# Patient Record
Sex: MALE | Race: WHITE
[De-identification: names, ages, dates, MRNs, and addresses within clinical notes are randomized per-mention and may not be internally consistent; named-entity substitution may affect disease eponyms.]

---

## 2018-04-03 ENCOUNTER — HOSPITAL ENCOUNTER (OUTPATIENT)
Dept: HOSPITAL 47 - RADMRIMAIN | Age: 78
End: 2018-04-03
Attending: OTOLARYNGOLOGY
Payer: MEDICARE

## 2018-04-03 DIAGNOSIS — G52.9: ICD-10-CM

## 2018-04-03 DIAGNOSIS — Z98.890: ICD-10-CM

## 2018-04-03 DIAGNOSIS — H74.8X1: ICD-10-CM

## 2018-04-03 DIAGNOSIS — G31.9: Primary | ICD-10-CM

## 2018-04-03 PROCEDURE — 70551 MRI BRAIN STEM W/O DYE: CPT

## 2018-04-03 NOTE — MR
EXAMINATION TYPE: MR brain and iac wo con

 

DATE OF EXAM: 4/3/2018

 

COMPARISON: CT 1/16/2014 and preoperative MRI 8/17/2012

 

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

 

TECHNIQUE:  Multiplanar, multisequence images of the brain and brainstem were acquired without IV con
trast. Diffusion weighted imaging was performed.  Additional coned-down sequences through the interna
l auditory canals and posterior cranial fossa before IV contrast administration.  

 

The technologist notes that contrast was not given as the patient could no longer tolerate the exam.

 

 

FINDINGS:  

Diffusion weighted images demonstrate no evidence of an acute ischemic lesion in the brain.

 

T2/FLAIR weighted sequences show no significant white matter signal abnormality.

 

Midline structures demonstrate normal morphology.  The craniocervical junction is normal. 

 

There is mild generalized supratentorial volume loss. The ventricles are of normal caliber.  

 

There is no evidence of an acute intracranial hemorrhage, infarct, mass, mass-effect or an extra-axia
l fluid collection. 

 

There is no cerebellopontine angle mass. 

 

The internal auditory canals are otherwise symmetric.  

 

Brainstem and skull base abnormalities are  not seen.

 

There is continued opacification throughout the right-sided mastoid air cells status post partial res
ection. No fluid seen within the middle ear cavity. Small amount of trapped fluid in the inferior lef
t mastoid air cells as well.

 

There is moderate mucosal thickening throughout the ethmoid air cells and mild to moderate within the
 maxillary sinuses and frontal sinuses. This seems to be a small air-fluid level in the left maxillar
y sinus.

 

 

IMPRESSION:

1. Mild cerebral atrophy; no acute intracranial abnormality seen. Note that IV contrast was not admin
istered as the patient could no longer tolerate the exam.

2. Continued opacification of the right mastoid air cells status post partial resection. Correlate fo
r mastoiditis. On MRI, no definite fluid seen within the middle ear cavity.

3. Small amount of trapped fluid in the inferior left mastoid air cells as well. Correlate for any ma
stoid pain on this side to exclude mastoiditis.

4. Mild to moderate chronic pan sinus disease sparing the sphenoid sinuses. This shows significant im
provement from 2012. However, a small air-fluid level in the left maxillary sinus could represent a s
uperimposed acute sinusitis.

## 2022-04-14 ENCOUNTER — HOSPITAL ENCOUNTER (INPATIENT)
Dept: HOSPITAL 47 - EC | Age: 82
LOS: 8 days | Discharge: OTHER INSTITUTION | DRG: 291 | End: 2022-04-22
Attending: INTERNAL MEDICINE | Admitting: INTERNAL MEDICINE
Payer: MEDICARE

## 2022-04-14 VITALS — BODY MASS INDEX: 37.9 KG/M2

## 2022-04-14 DIAGNOSIS — E87.5: ICD-10-CM

## 2022-04-14 DIAGNOSIS — I27.20: ICD-10-CM

## 2022-04-14 DIAGNOSIS — D50.9: ICD-10-CM

## 2022-04-14 DIAGNOSIS — E66.9: ICD-10-CM

## 2022-04-14 DIAGNOSIS — N28.1: ICD-10-CM

## 2022-04-14 DIAGNOSIS — I50.33: ICD-10-CM

## 2022-04-14 DIAGNOSIS — Z79.82: ICD-10-CM

## 2022-04-14 DIAGNOSIS — E78.5: ICD-10-CM

## 2022-04-14 DIAGNOSIS — Z20.822: ICD-10-CM

## 2022-04-14 DIAGNOSIS — N17.0: ICD-10-CM

## 2022-04-14 DIAGNOSIS — C92.10: ICD-10-CM

## 2022-04-14 DIAGNOSIS — Z79.899: ICD-10-CM

## 2022-04-14 DIAGNOSIS — C64.1: ICD-10-CM

## 2022-04-14 DIAGNOSIS — C91.10: ICD-10-CM

## 2022-04-14 DIAGNOSIS — I25.10: ICD-10-CM

## 2022-04-14 DIAGNOSIS — R06.03: ICD-10-CM

## 2022-04-14 DIAGNOSIS — J18.9: ICD-10-CM

## 2022-04-14 DIAGNOSIS — I13.0: Primary | ICD-10-CM

## 2022-04-14 DIAGNOSIS — R09.02: ICD-10-CM

## 2022-04-14 DIAGNOSIS — Z95.5: ICD-10-CM

## 2022-04-14 DIAGNOSIS — K59.00: ICD-10-CM

## 2022-04-14 DIAGNOSIS — N18.31: ICD-10-CM

## 2022-04-14 LAB
ALBUMIN SERPL-MCNC: 3.1 G/DL (ref 3.5–5)
ALP SERPL-CCNC: 72 U/L (ref 38–126)
ALT SERPL-CCNC: 24 U/L (ref 4–49)
ANION GAP SERPL CALC-SCNC: 5 MMOL/L
APTT BLD: 23.8 SEC (ref 22–30)
AST SERPL-CCNC: 34 U/L (ref 17–59)
BASOPHILS # BLD AUTO: 0.1 K/UL (ref 0–0.2)
BASOPHILS NFR BLD AUTO: 0 %
BUN SERPL-SCNC: 36 MG/DL (ref 9–20)
CALCIUM SPEC-MCNC: 9 MG/DL (ref 8.4–10.2)
CHLORIDE SERPL-SCNC: 103 MMOL/L (ref 98–107)
CO2 SERPL-SCNC: 31 MMOL/L (ref 22–30)
EOSINOPHIL # BLD AUTO: 0.5 K/UL (ref 0–0.7)
EOSINOPHIL NFR BLD AUTO: 2 %
ERYTHROCYTE [DISTWIDTH] IN BLOOD BY AUTOMATED COUNT: 3.64 M/UL (ref 4.3–5.9)
ERYTHROCYTE [DISTWIDTH] IN BLOOD: 14.2 % (ref 11.5–15.5)
GLUCOSE BLD-MCNC: 136 MG/DL (ref 75–99)
GLUCOSE SERPL-MCNC: 101 MG/DL (ref 74–99)
HCT VFR BLD AUTO: 35.2 % (ref 39–53)
HGB BLD-MCNC: 11.5 GM/DL (ref 13–17.5)
INR PPP: 0.9 (ref ?–1.2)
LYMPHOCYTES # SPEC AUTO: 1.4 K/UL (ref 1–4.8)
LYMPHOCYTES NFR SPEC AUTO: 6 %
MAGNESIUM SPEC-SCNC: 2 MG/DL (ref 1.6–2.3)
MCH RBC QN AUTO: 31.5 PG (ref 25–35)
MCHC RBC AUTO-ENTMCNC: 32.6 G/DL (ref 31–37)
MCV RBC AUTO: 96.8 FL (ref 80–100)
MONOCYTES # BLD AUTO: 1.4 K/UL (ref 0–1)
MONOCYTES NFR BLD AUTO: 6 %
NEUTROPHILS # BLD AUTO: 18.1 K/UL (ref 1.3–7.7)
NEUTROPHILS NFR BLD AUTO: 82 %
PLATELET # BLD AUTO: 265 K/UL (ref 150–450)
POTASSIUM SERPL-SCNC: 4.4 MMOL/L (ref 3.5–5.1)
PROT SERPL-MCNC: 6.2 G/DL (ref 6.3–8.2)
PT BLD: 10.2 SEC (ref 9–12)
SODIUM SERPL-SCNC: 139 MMOL/L (ref 137–145)
WBC # BLD AUTO: 22 K/UL (ref 3.8–10.6)

## 2022-04-14 PROCEDURE — 93970 EXTREMITY STUDY: CPT

## 2022-04-14 PROCEDURE — 85027 COMPLETE CBC AUTOMATED: CPT

## 2022-04-14 PROCEDURE — 96365 THER/PROPH/DIAG IV INF INIT: CPT

## 2022-04-14 PROCEDURE — 87636 SARSCOV2 & INF A&B AMP PRB: CPT

## 2022-04-14 PROCEDURE — 87040 BLOOD CULTURE FOR BACTERIA: CPT

## 2022-04-14 PROCEDURE — 78580 LUNG PERFUSION IMAGING: CPT

## 2022-04-14 PROCEDURE — 96375 TX/PRO/DX INJ NEW DRUG ADDON: CPT

## 2022-04-14 PROCEDURE — 85730 THROMBOPLASTIN TIME PARTIAL: CPT

## 2022-04-14 PROCEDURE — 94760 N-INVAS EAR/PLS OXIMETRY 1: CPT

## 2022-04-14 PROCEDURE — 85379 FIBRIN DEGRADATION QUANT: CPT

## 2022-04-14 PROCEDURE — 83880 ASSAY OF NATRIURETIC PEPTIDE: CPT

## 2022-04-14 PROCEDURE — 83735 ASSAY OF MAGNESIUM: CPT

## 2022-04-14 PROCEDURE — 84484 ASSAY OF TROPONIN QUANT: CPT

## 2022-04-14 PROCEDURE — 76770 US EXAM ABDO BACK WALL COMP: CPT

## 2022-04-14 PROCEDURE — 84443 ASSAY THYROID STIM HORMONE: CPT

## 2022-04-14 PROCEDURE — 80048 BASIC METABOLIC PNL TOTAL CA: CPT

## 2022-04-14 PROCEDURE — 93005 ELECTROCARDIOGRAM TRACING: CPT

## 2022-04-14 PROCEDURE — 80053 COMPREHEN METABOLIC PANEL: CPT

## 2022-04-14 PROCEDURE — 81001 URINALYSIS AUTO W/SCOPE: CPT

## 2022-04-14 PROCEDURE — 85025 COMPLETE CBC W/AUTO DIFF WBC: CPT

## 2022-04-14 PROCEDURE — 71045 X-RAY EXAM CHEST 1 VIEW: CPT

## 2022-04-14 PROCEDURE — 85610 PROTHROMBIN TIME: CPT

## 2022-04-14 PROCEDURE — 36415 COLL VENOUS BLD VENIPUNCTURE: CPT

## 2022-04-14 PROCEDURE — 87635 SARS-COV-2 COVID-19 AMP PRB: CPT

## 2022-04-14 PROCEDURE — 84145 PROCALCITONIN (PCT): CPT

## 2022-04-14 PROCEDURE — 74176 CT ABD & PELVIS W/O CONTRAST: CPT

## 2022-04-14 PROCEDURE — 76705 ECHO EXAM OF ABDOMEN: CPT

## 2022-04-14 PROCEDURE — 99285 EMERGENCY DEPT VISIT HI MDM: CPT

## 2022-04-14 PROCEDURE — 93306 TTE W/DOPPLER COMPLETE: CPT

## 2022-04-14 NOTE — ECHOF
Referral Reason:CHF



MEASUREMENTS

--------

HEIGHT: 170.2 cm

WEIGHT: 104.3 kg

BP: 172/73

RVIDd:   3.3 cm     (< 3.3)

IVSd:   1.1 cm     (0.6 - 1.1)

LVIDd:   4.6 cm     (3.9 - 5.3)

LVPWd:   1.2 cm     (0.6 - 1.1)

IVSs:   1.7 cm

LVIDs:   3.0 cm

LVPWs:   1.7 cm

LA Diam:   3.5 cm     (2.7 - 3.8)

Ao Diam:   3.3 cm     (2.0 - 3.7)

MV EXCURSION:   17.354 mm     (> 18.000)

MV EF SLOPE:   131 mm/s     (70 - 150)

EPSS:   0.4 cm

MV E Flex:   1.23 m/s

MV DecT:   240 ms

MV A Flex:   1.23 m/s

MV E/A Ratio:   1.00 

AV maxP.97 mmHg

AV meanP.07 mmHg

RAP:   5.00 mmHg

RVSP:   40.70 mmHg







FINDINGS

--------

Sinus rhythm.

This was a technically adequate study.

The left ventricular size is normal.   There is borderline concentric left ventricular hypertrophy.  
 Overall left ventricular systolic function is normal with, an EF between 60 - 65 %.

The right ventricle is mildly enlarged.

The left atrium is normal in size.

The right atrium is normal in size.

Interatrial and interventricular septum intact.

There is mild aortic valve sclerosis.   There is mild aortic stenosis present.   Peak/mean gradient a
cross the Aortic Valve is 22.97mmHg / 12.07mmHg.

The mitral valve leaflets are mildly thickened.   Mild mitral annular calcification present.   Mild m
itral regurgitation is present.

Mild tricuspid regurgitation present.   There is mild pulmonary hypertension.   The right ventricular
 systolic pressure, as measured by Doppler, is 40.70mmHg.

The pulmonic valve was not well visualized.

The aortic root size is normal.

Normal inferior vena cava with normal inspiratory collapse consistent with estimated right atrial pre
ssure of  5 mmHg.

There is no pericardial effusion.



CONCLUSIONS

--------

1. The left ventricular size is normal.

2. There is borderline concentric left ventricular hypertrophy.

3. Overall left ventricular systolic function is normal with, an EF between 60 - 65 %.

4. The right ventricle is mildly enlarged.

5. There is mild aortic valve sclerosis.

6. There is mild aortic stenosis present.

7. Peak/mean gradient across the Aortic Valve is 22.97mmHg / 12.07mmHg.

8. The mitral valve leaflets are mildly thickened.

9. Mild mitral annular calcification present.

10. Mild mitral regurgitation is present.

11. Mild tricuspid regurgitation present.

12. There is mild pulmonary hypertension.

13. The right ventricular systolic pressure, as measured by Doppler, is 40.70mmHg.

14. There is no pericardial effusion.





SONOGRAPHER: Ninfa Mojica RDCS

## 2022-04-14 NOTE — ED
General Adult HPI





- General


Chief complaint: Shortness of Breath


Stated complaint: RAJANI


Time Seen by Provider: 04/14/22 09:05


Source: patient


Mode of arrival: wheelchair


Limitations: no limitations





- History of Present Illness


Initial comments: 


Dictation was produced using dragon dictation software. please excuse any 

grammatical, word or spelling errors. 











Chief Complaint: 82-year-old male presents to the emergency department for exert

ional dyspnea, lower extremity swelling and abdominal swelling.





History of Present Illness: Patient is a 82-year-old male has past medical 

history of coronary artery disease.  He denies any history of heart failure.  

Over the last couple days she's been having exertional dyspnea and swelling to 

his extremities and abdomen.  Patient states his been progressively worsening 

over the last several days.  Denies any chest pain at this time.  No fever or 

cough.  Patient does have an established cardiologist.  He has not had any 

recent echocardiograms.








The ROS documented in this emergency department record has been reviewed and 

confirmed by me.  Those systems with pertinent positive or negative responses 

have been documented in the HPI.  All other systems are other negative and/or 

noncontributory.








PHYSICAL EXAM:


General Impression: Alert and oriented x3, not in acute distress


HEENT: Normocephalic atraumatic, extra-ocular movements intact, pupils equal and

reactive to light bilaterally, mucous membranes moist.


Cardiovascular: Heart regular rate and rhythm


Chest: Mildly dyspneic, diffuse lung crackles


Abdomen: abdomen soft, non-tender, non-distended, no organomegaly


Musculoskeletal: Pulses present and equal in all extremities, no peripheral 

edema


Motor:  no focal deficits noted


Neurological: CN II-XII grossly intact, no focal motor or sensory deficits noted


Skin: Intact with no visualized rashes


Psych: Normal affect and mood





ED course: 82-year-old male presents to the emergency department with symptoms 

consistent with congestive heart failure.  Patient does not have a history of 

heart failure.  There is concern for new onset cardiomyopathy.  Vital signs upon

arrival are within acceptable limits.  He does appear mildly dyspneic at the 

bedside.  EKG does not show any signs of ischemia or infarction.





Laboratory evaluation shows leukocytosis of 22.0.  Rest of CBC coag panel is 

unremarkable.  Metabolic panel is within acceptable limits.  Troponin is 0.028 

pre-nitric peptide of 2670.  No old labs for comparison.  For panel viral PCR is

negative for influenza, RSV and COVID-19.  Chest x-ray read by radiology 

interpreted as right lower lobe infiltrate with small pleural effusion.  Patient

reevaluated at the bedside.  He states he didn't has been having very mild and 

not that noticeable symptoms of cough.  Clinically speaking his history of 

present illness suggests new-onset heart failure however pneumonia as part of 

the differential.  Patient treated with antibiotics.  He is given 40 mg of 

Lasix.  He evaluated at bedside at 10:45 AM found to be in stable medical co

ndition.  Patient be admitted.  Case discussed with son physician group was went

accept patient's care.  Cardiology consulted.





EKG interpretation: Ventricular rate 85, sinus rhythm, PA interval.  QRS 82, QTC

369











- Related Data


                                Home Medications











 Medication  Instructions  Recorded  Confirmed


 


Imatinib Mesylate [Gleevec] 400 mg PO DAILY 09/01/14 04/14/22


 


Aspirin EC [Ecotrin Low Dose] 81 mg PO DAILY 04/14/22 04/14/22


 


Carvedilol [Coreg] 12.5 mg PO BID 04/14/22 04/14/22


 


Diclofenac Sodium Gel [Voltaren 2 gm TOPICAL QID 04/14/22 04/14/22





Gel]   


 


Ferrous Sulfate [Feosol] 325 mg PO DAILY 04/14/22 04/14/22


 


amLODIPine/ATORVASTATIN [Caduet 10 1 tab PO DAILY 04/14/22 04/14/22





mg-20 mg Tablet]   











                                    Allergies











Allergy/AdvReac Type Severity Reaction Status Date / Time


 


No Known Allergies Allergy   Verified 04/14/22 10:33














Review of Systems


ROS Statement: 


Those systems with pertinent positive or pertinent negative responses have been 

documented in the HPI.





ROS Other: All systems not noted in ROS Statement are negative.





Past Medical History


Past Medical History: Coronary Artery Disease (CAD), Cancer, Hyperlipidemia, 

Hypertension


Additional Past Medical History / Comment(s): pleurisy


History of Any Multi-Drug Resistant Organisms: None Reported


Past Surgical History: Heart Catheterization With Stent


Past Psychological History: No Psychological Hx Reported


Smoking Status: Never smoker


Past Alcohol Use History: None Reported


Past Drug Use History: None Reported





General Exam


Limitations: no limitations





Course


                                   Vital Signs











  04/14/22 04/14/22





  08:59 09:06


 


Temperature 97.9 F 


 


Pulse Rate 86 


 


Respiratory 20 20





Rate  


 


Blood Pressure 161/80 


 


O2 Sat by Pulse 96 





Oximetry  














Medical Decision Making





- Lab Data


Result diagrams: 


                                 04/14/22 09:11





                                 04/14/22 09:11


                                   Lab Results











  04/14/22 04/14/22 04/14/22 Range/Units





  09:11 09:11 09:11 


 


WBC  22.0 H    (3.8-10.6)  k/uL


 


RBC  3.64 L    (4.30-5.90)  m/uL


 


Hgb  11.5 L    (13.0-17.5)  gm/dL


 


Hct  35.2 L    (39.0-53.0)  %


 


MCV  96.8    (80.0-100.0)  fL


 


MCH  31.5    (25.0-35.0)  pg


 


MCHC  32.6    (31.0-37.0)  g/dL


 


RDW  14.2    (11.5-15.5)  %


 


Plt Count  265    (150-450)  k/uL


 


MPV  7.5    


 


Neutrophils %  82    %


 


Lymphocytes %  6    %


 


Monocytes %  6    %


 


Eosinophils %  2    %


 


Basophils %  0    %


 


Neutrophils #  18.1 H    (1.3-7.7)  k/uL


 


Lymphocytes #  1.4    (1.0-4.8)  k/uL


 


Monocytes #  1.4 H    (0-1.0)  k/uL


 


Eosinophils #  0.5    (0-0.7)  k/uL


 


Basophils #  0.1    (0-0.2)  k/uL


 


PT   10.2   (9.0-12.0)  sec


 


INR   0.9   (<1.2)  


 


APTT   23.8   (22.0-30.0)  sec


 


Sodium    139  (137-145)  mmol/L


 


Potassium    4.4  (3.5-5.1)  mmol/L


 


Chloride    103  ()  mmol/L


 


Carbon Dioxide    31 H  (22-30)  mmol/L


 


Anion Gap    5  mmol/L


 


BUN    36 H  (9-20)  mg/dL


 


Creatinine    1.33 H  (0.66-1.25)  mg/dL


 


Est GFR (CKD-EPI)AfAm    57  (>60 ml/min/1.73 sqM)  


 


Est GFR (CKD-EPI)NonAf    49  (>60 ml/min/1.73 sqM)  


 


Glucose    101 H  (74-99)  mg/dL


 


Calcium    9.0  (8.4-10.2)  mg/dL


 


Magnesium    2.0  (1.6-2.3)  mg/dL


 


Total Bilirubin    0.5  (0.2-1.3)  mg/dL


 


AST    34  (17-59)  U/L


 


ALT    24  (4-49)  U/L


 


Alkaline Phosphatase    72  ()  U/L


 


Troponin I     (0.000-0.034)  ng/mL


 


NT-Pro-B Natriuret Pep     pg/mL


 


Total Protein    6.2 L  (6.3-8.2)  g/dL


 


Albumin    3.1 L  (3.5-5.0)  g/dL


 


Influenza Type A (PCR)     (Not Detectd)  


 


Influenza Type B (PCR)     (Not Detectd)  


 


RSV (PCR)     (Not Detectd)  


 


SARS-CoV-2 (PCR)     (Not Detectd)  














  04/14/22 04/14/22 04/14/22 Range/Units





  09:11 09:11 09:11 


 


WBC     (3.8-10.6)  k/uL


 


RBC     (4.30-5.90)  m/uL


 


Hgb     (13.0-17.5)  gm/dL


 


Hct     (39.0-53.0)  %


 


MCV     (80.0-100.0)  fL


 


MCH     (25.0-35.0)  pg


 


MCHC     (31.0-37.0)  g/dL


 


RDW     (11.5-15.5)  %


 


Plt Count     (150-450)  k/uL


 


MPV     


 


Neutrophils %     %


 


Lymphocytes %     %


 


Monocytes %     %


 


Eosinophils %     %


 


Basophils %     %


 


Neutrophils #     (1.3-7.7)  k/uL


 


Lymphocytes #     (1.0-4.8)  k/uL


 


Monocytes #     (0-1.0)  k/uL


 


Eosinophils #     (0-0.7)  k/uL


 


Basophils #     (0-0.2)  k/uL


 


PT     (9.0-12.0)  sec


 


INR     (<1.2)  


 


APTT     (22.0-30.0)  sec


 


Sodium     (137-145)  mmol/L


 


Potassium     (3.5-5.1)  mmol/L


 


Chloride     ()  mmol/L


 


Carbon Dioxide     (22-30)  mmol/L


 


Anion Gap     mmol/L


 


BUN     (9-20)  mg/dL


 


Creatinine     (0.66-1.25)  mg/dL


 


Est GFR (CKD-EPI)AfAm     (>60 ml/min/1.73 sqM)  


 


Est GFR (CKD-EPI)NonAf     (>60 ml/min/1.73 sqM)  


 


Glucose     (74-99)  mg/dL


 


Calcium     (8.4-10.2)  mg/dL


 


Magnesium     (1.6-2.3)  mg/dL


 


Total Bilirubin     (0.2-1.3)  mg/dL


 


AST     (17-59)  U/L


 


ALT     (4-49)  U/L


 


Alkaline Phosphatase     ()  U/L


 


Troponin I  0.028    (0.000-0.034)  ng/mL


 


NT-Pro-B Natriuret Pep   2670   pg/mL


 


Total Protein     (6.3-8.2)  g/dL


 


Albumin     (3.5-5.0)  g/dL


 


Influenza Type A (PCR)    Not Detected  (Not Detectd)  


 


Influenza Type B (PCR)    Not Detected  (Not Detectd)  


 


RSV (PCR)    Not Detected  (Not Detectd)  


 


SARS-CoV-2 (PCR)    Not Detected  (Not Detectd)  














Disposition


Clinical Impression: 


 Heart failure, Pneumonia





Disposition: ADMITTED AS IP TO THIS HOSP


Condition: Fair


Referrals: 


Wilber Fuentes MD [Primary Care Provider] - 1-2 days


Decision Time: 10:44

## 2022-04-14 NOTE — XR
EXAMINATION TYPE: XR chest 1V portable

 

DATE OF EXAM: 4/14/2022

 

COMPARISON: NONE

 

HISTORY: dyspnea 

 

TECHNIQUE: Single frontal view of the chest is obtained.

 

FINDINGS:  Right lower lobe infiltrate with small effusion. There is a coarsened interstitium. Heart 
is enlarged. There is hyperinflation. Diffuse osteopenia. Atherosclerotic change of the aorta.

 

IMPRESSION:  Right lower lobe infiltrate and small pleural effusion.

## 2022-04-14 NOTE — P.CRDCN
History of Present Illness


History of present illness: 


This is a 82 year old male with a past medical history of coronary artery 

disease s/p PCI x 2 to the mid LAD in 2006, hypertension, dyslipidemia, iron 

deficiency anemia.  He follows in the office with Dr. Burgos. We have been asked

to see the patient in consultation for congestive heart failure. He presents 

with worsening shortness of breath, dyspnea with activity and worsening 

bilaterally lower extremity edema. He states about 2 weeks ago he was able to 

perform his daily activities, go for walks, however over the past week his 

symptoms have worsened. He has gained about 10lbs over about a week. He denies 

any orthopnea or PND. He sleeps with 1 pillow. He was recently started on ferr

ous sulfate, but no further medication changes. He denies any increased salt 

intake/diet changes. He is a non-smoker. He denies history of diabetes or 

stroke. 


He does endorse a cough and diaphoresis yesterday. Denies fever or chills. 





DIAGNOSTICS


-EKG reveals sinus rhythm, heart rate 85, poor R wave progression, no acute ST 

ST wave abnormalities to suggest ischemia.  Prior EKG in the office in 02/2022 

with similar findings.


-Most recent echocardiogram in the office 04/2021 revealed an EF of 5560 

percent, mild mitral regurgitation, mild to moderate tricuspid regurgitation, 

trileaflet aortic valve, mild aortic regurgitation, mild aortic stenosis


-Chest xray right lower lobe infiltrate, right-sided pleural effusion


-Laboratory reviewed, troponin negative, proBNP 2670, sodium 139, potassium 4.4,

BUN 36, serum creatinine 1.3, magnesium 2.0, W proceed 22, hemoglobin 11.5, 

platelets 265


-Current home medications include Caduet 1020 milligrams daily, ferrous 

sulfate, carvedilol 12.5 mg twice a day, aspirin 80 mg daily


-Lexiscan stress test 01/14/2020 in the office revealed probably normal 

myocardial effusion perfusion imaging with a fixed inferior wall defect and 

normal gated SPECT images consistent with soft tissue attenuation.  No evidence 

of stress-induced ischemia.





REVIEW OF SYSTEMS


At the time of my exam:


CONSTITUTIONAL: Denies fever or chills.


CARDIOVASCULAR: Denies chest pain, +shortness of breath, +LE edema Denies 

orthopnea, PND or palpitations.


RESPIRATORY: Reports cough. 


GASTROINTESTINAL: Denies abdominal pain, diarrhea, constipation, nausea or 

vomiting.


MUSCULOSKELETAL: Denies myalgias.


NEUROLOGIC: Denies numbness, tingling, headacbe or weakness.


ENDOCRINE: Denies fatigue, weight change,  polydipsia or polyurina.


GENITOURINARY: Denies burning, hematuria or urgency with micturation.


HEMATOLOGIC: Denies history of anemia or bleeding. 





PHYSICAL EXAMINATION


Blood pressure 161/80, heart rate 86, afebrile, saturations 96% on room air


CONSTITUTIONAL: No apparent distress. 


HEENT: Head is normocephalic. Pupils are equal, round. Sclerae anicteric. Mucous

membranes of the mouth are moist.  No JVD. 


CHEST EXAMINATION: Lungs are diminished in the bases, crackles on the right 

lower lobe to auscultation. No chest wall tenderness is noted on palpation or 

with deep breathing. 


HEART EXAMINATION: Regular rate and rhythm. S1, S2 heard. Systolic ejection 

murmur heard at base and apex 


ABDOMEN: Soft, nontender. Positive bowel sounds.


EXTREMITIES: 2+ peripheral pulses, 3+ bilateral lower extremity edema and no 

calf tenderness.


NEUROLOGIC EXAMINATION: Patient is awake, alert and oriented x3. 





ASSESSMENT


Symptoms of shortness of breath, lower extremity edema, likely due to pneumonia 

vs component of heart failure with preserved EF, amlodipine could also be contri

buting lower extremity edema 


Right lower lobe pneumonia


Leukocytosis 


Coronary artery disease s/p PCI x 2 to the mid LAD in 2006


Hypertension


Dyslipidemia


Recent diagnosis of Iron deficiency anemia 


Valvular heart disease 





PLAN


Echo revealed EF 60-65%, mild aortic stenosis, mild mitral regurgitation, mild 

tricuspid regurgitation, mild pulmonary hypertension with RVSP of 40 mmHg


Stop amlodipine 


Transition to PO Lasix 40mg daily 


Monitor I/Os, daily weights, renal function and electrolytes


Pneumonia management per primary


Continue aspirin, statin, carvedilol


Further recommendations based on clinical course





Nurse practitioner note has been reviewed by physician. Signing provider agrees 

with the documented findings, assessment, and plan of care. 














Past Medical History


Past Medical History: Coronary Artery Disease (CAD), Cancer, Hyperlipidemia, Hy

pertension


Additional Past Medical History / Comment(s): pleurisy


History of Any Multi-Drug Resistant Organisms: None Reported


Past Surgical History: Heart Catheterization With Stent


Past Psychological History: No Psychological Hx Reported


Smoking Status: Never smoker


Past Alcohol Use History: None Reported


Past Drug Use History: None Reported





Medications and Allergies


                                Home Medications











 Medication  Instructions  Recorded  Confirmed  Type


 


Imatinib Mesylate [Gleevec] 400 mg PO DAILY 09/01/14 04/14/22 History


 


Aspirin EC [Ecotrin Low Dose] 81 mg PO DAILY 04/14/22 04/14/22 History


 


Carvedilol [Coreg] 12.5 mg PO BID 04/14/22 04/14/22 History


 


Diclofenac Sodium Gel [Voltaren 2 gm TOPICAL QID 04/14/22 04/14/22 History





Gel]    


 


Ferrous Sulfate [Feosol] 325 mg PO DAILY 04/14/22 04/14/22 History


 


amLODIPine/ATORVASTATIN [Caduet 10 1 tab PO DAILY 04/14/22 04/14/22 History





mg-20 mg Tablet]    








                                    Allergies











Allergy/AdvReac Type Severity Reaction Status Date / Time


 


No Known Allergies Allergy   Verified 04/14/22 10:33














Physical Exam


Vitals: 


                                   Vital Signs











  Temp Pulse Resp BP Pulse Ox


 


 04/14/22 11:14     172/73 


 


 04/14/22 09:06    20  


 


 04/14/22 08:59  97.9 F  86  20  161/80  96








                                Intake and Output











 04/13/22 04/14/22 04/14/22





 22:59 06:59 14:59


 


Other:   


 


  Weight   104.326 kg














Results





                                 04/14/22 09:11





                                 04/14/22 09:11


                                 Cardiac Enzymes











  04/14/22 04/14/22 Range/Units





  09:11 09:11 


 


AST  34   (17-59)  U/L


 


Troponin I   0.028  (0.000-0.034)  ng/mL








                                   Coagulation











  04/14/22 Range/Units





  09:11 


 


PT  10.2  (9.0-12.0)  sec


 


APTT  23.8  (22.0-30.0)  sec








                                       CBC











  04/14/22 Range/Units





  09:11 


 


WBC  22.0 H  (3.8-10.6)  k/uL


 


RBC  3.64 L  (4.30-5.90)  m/uL


 


Hgb  11.5 L  (13.0-17.5)  gm/dL


 


Hct  35.2 L  (39.0-53.0)  %


 


Plt Count  265  (150-450)  k/uL








                          Comprehensive Metabolic Panel











  04/14/22 Range/Units





  09:11 


 


Sodium  139  (137-145)  mmol/L


 


Potassium  4.4  (3.5-5.1)  mmol/L


 


Chloride  103  ()  mmol/L


 


Carbon Dioxide  31 H  (22-30)  mmol/L


 


BUN  36 H  (9-20)  mg/dL


 


Creatinine  1.33 H  (0.66-1.25)  mg/dL


 


Glucose  101 H  (74-99)  mg/dL


 


Calcium  9.0  (8.4-10.2)  mg/dL


 


AST  34  (17-59)  U/L


 


ALT  24  (4-49)  U/L


 


Alkaline Phosphatase  72  ()  U/L


 


Total Protein  6.2 L  (6.3-8.2)  g/dL


 


Albumin  3.1 L  (3.5-5.0)  g/dL








                               Current Medications











Generic Name Dose Route Start Last Admin





  Trade Name Freq  PRN Reason Stop Dose Admin


 


Aspirin  81 mg  04/15/22 09:00 





  Aspirin 81 Mg  PO  





  DAILY [facility]  


 


Atorvastatin Calcium  40 mg  04/15/22 09:00 





  Atorvastatin 40 Mg Tab  PO  





  DAILY [facility]  


 


Carvedilol  12.5 mg  04/14/22 17:30 





  Carvedilol 12.5 Mg Tab  PO  





  AC-BID [facility]  


 


Enoxaparin Sodium  40 mg  04/15/22 09:00 





  Enoxaparin 40 Mg/0.4 Ml Syringe  SQ  





  DAILY [facility]  


 


Ferrous Sulfate  325 mg  04/15/22 09:00 





  Ferrous Sulfate 325 Mg Tab  PO  





  DAILY [facility]  


 


Furosemide  40 mg  04/14/22 21:00 





  Furosemide 10 Mg/Ml 4 Ml Vial  IV  





  Q12HR [facility]  


 


Ceftriaxone Sodium 1 gm/  50 mls @ 100 mls/hr  04/15/22 09:00 





  Sodium Chloride  IVPB  04/20/22 09:01 





  Q24HR [facility]  





  Protocol  


 


Azithromycin 500 mg/ Sodium  250 mls @ 250 mls/hr  04/15/22 09:00 





  Chloride  IVPB  04/17/22 09:59 





  DAILY [facility]  





  Protocol  








                                Intake and Output











 04/13/22 04/14/22 04/14/22





 22:59 06:59 14:59


 


Other:   


 


  Weight   104.326 kg








                                 Patient Weight











 04/15/22





 06:59


 


Weight 104.326 kg








                                        





                                 04/14/22 09:11 





                                 04/14/22 09:11

## 2022-04-14 NOTE — US
EXAMINATION TYPE: US venous doppler duplex LE BI

 

DATE OF EXAM: 4/14/2022 11:17 AM

 

COMPARISON: NONE

 

CLINICAL HISTORY: Edema and pain, to r/o DVT.

 

SIDE PERFORMED: Bilateral  

 

TECHNIQUE:  The lower extremity deep venous system is examined utilizing real time linear array sonog
ananya with graded compression, doppler sonography and color-flow sonography.

 

VESSELS IMAGED:

Common Femoral Vein

Deep Femoral Vein

Greater Saphenous Vein *

Femoral Vein

Popliteal Vein

Small Saphenous Vein *

Proximal Calf Veins

(* superficial vessels)

 

 

 

Right Leg:  Appears negative for DVT

 

Left Leg:  Appears negative for DVT

 

 

 

IMPRESSION:

1. No diagnostic evidence of DVT.

## 2022-04-15 LAB
ANION GAP SERPL CALC-SCNC: 6 MMOL/L
BASOPHILS # BLD AUTO: 0 K/UL (ref 0–0.2)
BASOPHILS NFR BLD AUTO: 0 %
BUN SERPL-SCNC: 34 MG/DL (ref 9–20)
CALCIUM SPEC-MCNC: 8.8 MG/DL (ref 8.4–10.2)
CHLORIDE SERPL-SCNC: 99 MMOL/L (ref 98–107)
CO2 SERPL-SCNC: 32 MMOL/L (ref 22–30)
EOSINOPHIL # BLD AUTO: 0.5 K/UL (ref 0–0.7)
EOSINOPHIL NFR BLD AUTO: 3 %
ERYTHROCYTE [DISTWIDTH] IN BLOOD BY AUTOMATED COUNT: 3.56 M/UL (ref 4.3–5.9)
ERYTHROCYTE [DISTWIDTH] IN BLOOD: 14.1 % (ref 11.5–15.5)
GLUCOSE SERPL-MCNC: 120 MG/DL (ref 74–99)
HCT VFR BLD AUTO: 35 % (ref 39–53)
HGB BLD-MCNC: 11.1 GM/DL (ref 13–17.5)
LYMPHOCYTES # SPEC AUTO: 1.4 K/UL (ref 1–4.8)
LYMPHOCYTES NFR SPEC AUTO: 7 %
MCH RBC QN AUTO: 31.3 PG (ref 25–35)
MCHC RBC AUTO-ENTMCNC: 31.8 G/DL (ref 31–37)
MCV RBC AUTO: 98.3 FL (ref 80–100)
MONOCYTES # BLD AUTO: 1.4 K/UL (ref 0–1)
MONOCYTES NFR BLD AUTO: 8 %
NEUTROPHILS # BLD AUTO: 14.7 K/UL (ref 1.3–7.7)
NEUTROPHILS NFR BLD AUTO: 80 %
PLATELET # BLD AUTO: 253 K/UL (ref 150–450)
POTASSIUM SERPL-SCNC: 4.6 MMOL/L (ref 3.5–5.1)
SODIUM SERPL-SCNC: 137 MMOL/L (ref 137–145)
WBC # BLD AUTO: 18.5 K/UL (ref 3.8–10.6)

## 2022-04-15 RX ADMIN — ASPIRIN SCH MG: 325 TABLET ORAL at 21:04

## 2022-04-15 RX ADMIN — ENOXAPARIN SODIUM SCH MG: 40 INJECTION SUBCUTANEOUS at 09:20

## 2022-04-15 RX ADMIN — FUROSEMIDE SCH MG: 40 TABLET ORAL at 09:20

## 2022-04-15 RX ADMIN — AZITHROMYCIN MONOHYDRATE SCH MLS/HR: 500 INJECTION, POWDER, LYOPHILIZED, FOR SOLUTION INTRAVENOUS at 09:21

## 2022-04-15 RX ADMIN — ATORVASTATIN CALCIUM SCH MG: 40 TABLET, FILM COATED ORAL at 09:20

## 2022-04-15 RX ADMIN — Medication SCH MG: at 09:20

## 2022-04-15 RX ADMIN — ASPIRIN 81 MG CHEWABLE TABLET SCH MG: 81 TABLET CHEWABLE at 09:20

## 2022-04-15 NOTE — P.PN
Subjective


This is a 82 year old male with a past medical history of coronary artery 

disease s/p PCI x 2 to the mid LAD in 2006, hypertension, dyslipidemia, iron 

deficiency anemia, Leukemia.  He follows in the office with Dr. Burgos. We have 

been asked to see the patient in consultation for congestive heart failure. He 

presents with worsening shortness of breath, dyspnea with activity and worsening

bilaterally lower extremity edema. He states about 2 weeks ago he was able to 

perform his daily activities, go for walks, however over the past week his sympt

oms have worsened. He has gained about 10lbs over about a week. He denies any 

orthopnea or PND. He sleeps with 1 pillow. He was recently started on ferrous 

sulfate, but no further medication changes. He denies any increased salt 

intake/diet changes. He is a non-smoker. He denies history of diabetes or 

stroke. 


He does endorse a cough and diaphoresis yesterday. Denies fever or chills. 





DIAGNOSTICS


-Echo revealed EF 60-65%, mild aortic stenosis, mild mitral regurgitation, mild 

tricuspid regurgitation, mild pulmonary hypertension with RVSP of 40 mmHg


-Chest xray right lower lobe infiltrate, right-sided pleural effusion


-Venous dopplers negative for DVT bilaterally 





4/15/2022


Patient seen and examined at bedside, no acute distress.  He states he had 

increased shortness of breath, difficulty sleeping overnight.  He denies any 

chest pain.  Denies any orthopnea or PND.  His [place] edema has improved. He 

continues to have shortness of breath with activity. 





He is currently maintained on aspirin 81 mg daily, atorvastatin 40 mg daily, 

carvedilol 12.5 mg twice a day, Lasix 40 mg daily, hydralazine 25 mg TID


Also on IV antibiotics 


Repeat xray with improving infiltrate bilateral lower lobe 





Labs: Sodium 137, potassium 4.6, BUN 34, serum creatinine 1.38





PHYSICAL EXAMINATION


Blood pressure 116/72, heart rate 78, afebrile, oxygen saturation is 92% on room

air


CONSTITUTIONAL: No apparent distress. 


HEENT: Head is normocephalic. Pupils are equal, round. Sclerae anicteric. Mucous

membranes of the mouth are moist.  No JVD. 


CHEST EXAMINATION: Lungs are diminished in the bases, crackles on the right 

lower lobe to auscultation. No chest wall tenderness is noted on palpation or 

with deep breathing. 


HEART EXAMINATION: Regular rate and rhythm. S1, S2 heard. Systolic ejection 

murmur heard at base and apex 


ABDOMEN: Soft, nontender. Positive bowel sounds.


EXTREMITIES: 2+ peripheral pulses, 2+ bilateral lower extremity edema and no 

calf tenderness.


NEUROLOGIC EXAMINATION: Patient is awake, alert and oriented x3. 





ASSESSMENT


Symptoms of shortness of breath, lower extremity edema, likely due to pneumonia,

amlodipine could also be contributing lower extremity edema 


Right lower lobe pneumonia


Leukocytosis 


History of Leukemia, follows with Dr. Génesis chen


Coronary artery disease s/p PCI x 2 to the mid LAD in 2006


Hypertension


Dyslipidemia


Recent diagnosis of Iron deficiency anemia 


Valvular heart disease 





PLAN


Increase hydralazine to 50mg TID 


Continue to hold amlodipine 


PO Lasix 40mg daily 


Monitor I/Os, daily weights, renal function and electrolytes 


Pneumonia management per primary


Continue aspirin, statin, carvedilol


Further recommendations based on clinical course





Nurse practitioner note has been reviewed by physician. Signing provider agrees 

with the documented findings, assessment, and plan of care. 











Objective





- Vital Signs


Vital signs: 


                                   Vital Signs











Temp  97.8 F   04/15/22 03:25


 


Pulse  78   04/15/22 03:25


 


Resp  20   04/15/22 03:25


 


BP  169/72   04/15/22 03:25


 


Pulse Ox  92 L  04/15/22 03:25








                                 Intake & Output











 04/14/22 04/15/22 04/15/22





 18:59 06:59 18:59


 


Output Total 600  


 


Balance -600  


 


Weight 104.326 kg  


 


Output:   


 


  Urine 600  


 


Other:   


 


  Voiding Method Urinal Urinal 














- Labs


CBC & Chem 7: 


                                 04/15/22 08:30





                                 04/15/22 08:30


Labs: 


                  Abnormal Lab Results - Last 24 Hours (Table)











  04/14/22 04/14/22 04/14/22 Range/Units





  09:11 09:11 20:19 


 


WBC  22.0 H    (3.8-10.6)  k/uL


 


RBC  3.64 L    (4.30-5.90)  m/uL


 


Hgb  11.5 L    (13.0-17.5)  gm/dL


 


Hct  35.2 L    (39.0-53.0)  %


 


Neutrophils #  18.1 H    (1.3-7.7)  k/uL


 


Monocytes #  1.4 H    (0-1.0)  k/uL


 


Carbon Dioxide   31 H   (22-30)  mmol/L


 


BUN   36 H   (9-20)  mg/dL


 


Creatinine   1.33 H   (0.66-1.25)  mg/dL


 


Glucose   101 H   (74-99)  mg/dL


 


POC Glucose (mg/dL)    136 H  (75-99)  mg/dL


 


Total Protein   6.2 L   (6.3-8.2)  g/dL


 


Albumin   3.1 L   (3.5-5.0)  g/dL

## 2022-04-15 NOTE — P.PN
Subjective


Progress Note Date: 04/15/22


82-year-old male admitted yesterday with CHF exacerbation and community acquired

pneumonia.  He reports slight improvement today but is still complaining of a 

dry cough, and significant dyspnea with exertion.  He denies any chest pain, no 

fevers or chills, no shortness of breath at rest.  He is on day 2 of Rocephin 

and azithromycin, Lasix was changed to by mouth today








Objective





- Vital Signs


Vital signs: 


                                   Vital Signs











Temp  98.2 F   04/15/22 08:00


 


Pulse  74   04/15/22 08:00


 


Resp  20   04/15/22 08:00


 


BP  163/71   04/15/22 08:00


 


Pulse Ox  91 L  04/15/22 08:00








                                 Intake & Output











 04/14/22 04/15/22 04/15/22





 18:59 06:59 18:59


 


Intake Total   240


 


Output Total 600  


 


Balance -600  240


 


Weight 104.326 kg  104.326 kg


 


Intake:   


 


  Oral   240


 


Output:   


 


  Urine 600  


 


Other:   


 


  Voiding Method Urinal Urinal Urinal














- Exam





HEENT: Head is normocephalic. Pupils are equal, round. Sclerae anicteric. Mucous

membranes of the mouth are moist.  No JVD. 


CHEST EXAMINATION: Lungs are diminished in the bases, crackles on the right 

lower lobe to auscultation. No chest wall tenderness is noted on palpation or 

with deep breathing. 


HEART EXAMINATION: Regular rate and rhythm. S1, S2 heard. Systolic ejection 

murmur heard at base and apex 


ABDOMEN: Soft, nontender. Positive bowel sounds.


EXTREMITIES: 2+ peripheral pulses, 2+ bilateral lower extremity edema and no 

calf tenderness.


NEUROLOGIC EXAMINATION: Patient is awake, alert and oriented x3.





- Labs


CBC & Chem 7: 


                                 04/15/22 08:30





                                 04/15/22 08:30


Labs: 


                  Abnormal Lab Results - Last 24 Hours (Table)











  04/14/22 04/15/22 04/15/22 Range/Units





  20:19 08:30 08:30 


 


WBC   18.5 H   (3.8-10.6)  k/uL


 


RBC   3.56 L   (4.30-5.90)  m/uL


 


Hgb   11.1 L   (13.0-17.5)  gm/dL


 


Hct   35.0 L   (39.0-53.0)  %


 


Neutrophils #   14.7 H   (1.3-7.7)  k/uL


 


Monocytes #   1.4 H   (0-1.0)  k/uL


 


Carbon Dioxide    32 H  (22-30)  mmol/L


 


BUN    34 H  (9-20)  mg/dL


 


Creatinine    1.38 H  (0.66-1.25)  mg/dL


 


Glucose    120 H  (74-99)  mg/dL


 


POC Glucose (mg/dL)  136 H    (75-99)  mg/dL








                      Microbiology - Last 24 Hours (Table)











 04/14/22 11:00 Blood Culture - Preliminary





 Blood    No Growth after 24 hours


 


 04/14/22 10:45 Blood Culture - Preliminary





 Blood    No Growth after 24 hours














Assessment and Plan


Plan: 





Acute decompensated heart failure


- With preserved ejection fraction


- Lasix 40 mg IV given in the ED


-Lasix changed to oral today per cardiology


- Monitor intake and output, BMP, daily weights


- Fluid restriction to less than 1500 mL


- Amlodipine discontinued as it may contribute to lower extremity edema


-Patient counseled extensively on importance of fluid and salt restriction





Community acquired pneumonia


-Improving


- IV ceftriaxone and IV azithromycin to cover empirically


- Sputum culture, Legionella antigen pending





Coronary artery disease


- Continue home medications aspirin and atorvastatin


- Continue home medication carvedilol


-Coronary artery disease s/p PCI x 2 to the mid LAD in 2006


- We will check lipid profile and A1c.





Hypertension


- Continue Coreg, hydralazine adequate








 Obesity


- Lifestyle modification and dietary changes as able





History of Leukemia


follows with Dr. Capps group





Anticipated discharge home in 24-48 hours


Time with Patient: Less than 30

## 2022-04-16 LAB
ANION GAP SERPL CALC-SCNC: 5 MMOL/L
BASOPHILS # BLD AUTO: 0.1 K/UL (ref 0–0.2)
BASOPHILS NFR BLD AUTO: 1 %
BUN SERPL-SCNC: 33 MG/DL (ref 9–20)
CALCIUM SPEC-MCNC: 8.7 MG/DL (ref 8.4–10.2)
CHLORIDE SERPL-SCNC: 99 MMOL/L (ref 98–107)
CO2 SERPL-SCNC: 31 MMOL/L (ref 22–30)
EOSINOPHIL # BLD AUTO: 0.3 K/UL (ref 0–0.7)
EOSINOPHIL NFR BLD AUTO: 1 %
ERYTHROCYTE [DISTWIDTH] IN BLOOD BY AUTOMATED COUNT: 3.38 M/UL (ref 4.3–5.9)
ERYTHROCYTE [DISTWIDTH] IN BLOOD: 14.1 % (ref 11.5–15.5)
GLUCOSE SERPL-MCNC: 121 MG/DL (ref 74–99)
HCT VFR BLD AUTO: 33 % (ref 39–53)
HGB BLD-MCNC: 11 GM/DL (ref 13–17.5)
LYMPHOCYTES # SPEC AUTO: 1.1 K/UL (ref 1–4.8)
LYMPHOCYTES NFR SPEC AUTO: 6 %
MCH RBC QN AUTO: 32.5 PG (ref 25–35)
MCHC RBC AUTO-ENTMCNC: 33.3 G/DL (ref 31–37)
MCV RBC AUTO: 97.6 FL (ref 80–100)
MONOCYTES # BLD AUTO: 1.1 K/UL (ref 0–1)
MONOCYTES NFR BLD AUTO: 6 %
NEUTROPHILS # BLD AUTO: 15.1 K/UL (ref 1.3–7.7)
NEUTROPHILS NFR BLD AUTO: 85 %
PLATELET # BLD AUTO: 276 K/UL (ref 150–450)
POTASSIUM SERPL-SCNC: 4.8 MMOL/L (ref 3.5–5.1)
SODIUM SERPL-SCNC: 135 MMOL/L (ref 137–145)
WBC # BLD AUTO: 17.8 K/UL (ref 3.8–10.6)

## 2022-04-16 RX ADMIN — ATORVASTATIN CALCIUM SCH MG: 40 TABLET, FILM COATED ORAL at 09:25

## 2022-04-16 RX ADMIN — ASPIRIN 81 MG CHEWABLE TABLET SCH MG: 81 TABLET CHEWABLE at 09:25

## 2022-04-16 RX ADMIN — ASPIRIN SCH MG: 325 TABLET ORAL at 09:25

## 2022-04-16 RX ADMIN — ENOXAPARIN SODIUM SCH MG: 40 INJECTION SUBCUTANEOUS at 09:25

## 2022-04-16 RX ADMIN — FUROSEMIDE SCH MG: 40 TABLET ORAL at 09:26

## 2022-04-16 RX ADMIN — Medication SCH MG: at 09:26

## 2022-04-16 RX ADMIN — AZITHROMYCIN MONOHYDRATE SCH MLS/HR: 500 INJECTION, POWDER, LYOPHILIZED, FOR SOLUTION INTRAVENOUS at 10:21

## 2022-04-16 NOTE — P.CNPUL
History of Present Illness


Consult date: 04/16/22


Requesting physician: Tammy Jane


Reason for consult: dyspnea, abnormal CXR/CT


Chief complaint: Shortness of breath, dyspnea on exertion


History of present illness: 





This is an 82-year-old male patient who has a history of chronic lymphocytic 

leukemia, hypertension, hyperlipidemia, coronary artery disease with previous 

stent placement.  Nonsmoker.  He had presented to the emergency room back on 

04/14/2022 with complaints of increasing shortness of breath over the past month

worsening the past 2 weeks with significant dyspnea on exertion and swelling of 

the lower extremities.  X-ray revealed a right lower lobe infiltrate with small 

pleural effusion.  EKG reveals sinus rhythm with no significant ST or T wave 

abnormalities.  Echocardiogram revealed preserved left ventricular systolic 

function with ejection fraction 66 5%.  Mild pulmonary hypertension.  No 

significant valvular abnormalities.  Dopplers of the lower extremity were 

negative for DVT.  The cultures revealing no growth to date.  White count 17.8. 

Hemoglobin 11.0.  Sodium 135.  Potassium 4.8.  Bicarb 31.  BUN 33.  Creatinine 

1.27.  Glucose 121.  ProBNP 2670.  Influenza screen negative.  RSV screen 

negative.  COVID-19 screen negative.  The patient was treated with diuretics.  

Follow-up chest x-ray revealed improved bilateral lower lobe infiltrate and 

small effusion.  He is seen today in consultation on the selective care unit.  

Awake and alert in no acute distress.  He is sitting up in a chair at the 

bedside.  Maintaining O2 saturations in the 90s on 2 L/m per nasal cannula.  

Previously documented 96% on room air.  He's afebrile.  Hemodynamically stable.











Review of Systems





REVIEW OF SYSTEMS:


CONSTITUTIONAL: Denies any recent significant weight loss or weight gain.


EYES: Denies change in vision.


EARS, NOSE, MOUTH, THROAT: Denies headaches, denies sore throat.


CARDIOVASCULAR: Denies chest pain, palpitations or syncopal episodes.


RESPIRATORY: Positive for shortness of breath, cough, congestion no hemoptysis.


GASTROINTESTINAL: Denies change in appetite, denies abdominal pain


GENITOURINARY: Denies hematuria, denies infections.


MUSKULOSKELETAL: Positive for lower extremity swelling.


INTEGUMENTARY: Denies rash, denies eczema.


NEUROLOGICAL: Denies recent memory loss, no recent seizure activity. 


PSYCHIATRIC: Denies anxiety, denies depression.


HEMATOLOGIC/LYMPHATIC: Denies anemia, denies enlarged lymph nodes.








Past Medical History


Past Medical History: Coronary Artery Disease (CAD), Cancer, Hyperlipidemia, 

Hypertension


Additional Past Medical History / Comment(s): pleurisy


History of Any Multi-Drug Resistant Organisms: None Reported


Past Surgical History: Heart Catheterization With Stent


Date of Last Stent Placement:: 2007


Past Psychological History: No Psychological Hx Reported


Smoking Status: Never smoker


Past Alcohol Use History: None Reported


Past Drug Use History: None Reported





Medications and Allergies


                                Home Medications











 Medication  Instructions  Recorded  Confirmed  Type


 


Imatinib Mesylate [Gleevec] 400 mg PO DAILY 09/01/14 04/14/22 History


 


Aspirin EC [Ecotrin Low Dose] 81 mg PO DAILY 04/14/22 04/14/22 History


 


Carvedilol [Coreg] 12.5 mg PO BID 04/14/22 04/14/22 History


 


Diclofenac Sodium Gel [Voltaren 2 gm TOPICAL QID 04/14/22 04/14/22 History





Gel]    


 


Ferrous Sulfate [Feosol] 325 mg PO DAILY 04/14/22 04/14/22 History


 


amLODIPine/ATORVASTATIN [Caduet 10 1 tab PO DAILY 04/14/22 04/14/22 History





mg-20 mg Tablet]    








                                    Allergies











Allergy/AdvReac Type Severity Reaction Status Date / Time


 


No Known Allergies Allergy   Verified 04/14/22 10:33














Physical Exam


Vitals: 


                                   Vital Signs











  Temp Pulse Resp BP Pulse Ox


 


 04/16/22 08:00  98.0 F  78  18  139/67  96


 


 04/16/22 04:00  98.1 F  62  18  142/64  92 L


 


 04/16/22 00:00  98.1 F  76  18  142/68  95


 


 04/15/22 20:00  98.0 F  78  20  161/70  93 L


 


 04/15/22 16:00   79   139/65  90 L


 


 04/15/22 14:00   81  20  


 


 04/15/22 12:00   81   129/58  92 L








                                Intake and Output











 04/15/22 04/16/22 04/16/22





 22:59 06:59 14:59


 


Intake Total 250 180 240


 


Balance 250 180 240


 


Intake:   


 


  IV 10  


 


    Invasive Line 1 10  


 


  Oral 240 180 240


 


Other:   


 


  Voiding Method Toilet Toilet Toilet





 Urinal Urinal Urinal


 


  # Voids 1  














GENERAL EXAM: Alert, 82-year-old gentleman, up in a chair at the bedside, on 2 L

nasal cannula, comfortable in no apparent distress.


HEAD: Normocephalic.


EYES: Normal reaction of pupils, equal size.


NOSE: Clear with pink turbinates.


THROAT: No erythema or exudates.


NECK: No masses, no JVD.


CHEST: No chest wall deformity.


LUNGS: Equal air entry with crackles in the bilateral bases.


CVS: S1 and S2 normal with no audible murmur, regular rhythm.


ABDOMEN: No hepatosplenomegaly, normal bowel sounds, no guarding or rigidity.


SPINE: No scoliosis or deformity


SKIN: No rashes


CENTRAL NERVOUS SYSTEM: No focal deficits, tone is normal in all 4 extremities.


EXTREMITIES: There is trace peripheral edema.  No clubbing, no cyanosis.  

Peripheral pulses are intact.





Results





- Laboratory Findings


CBC and BMP: 


                                 04/16/22 08:14





                                 04/16/22 08:14


PT/INR, D-dimer











PT  10.2 sec (9.0-12.0)   04/14/22  09:11    


 


INR  0.9  (<1.2)   04/14/22  09:11    








Abnormal lab findings: 


                                  Abnormal Labs











  04/14/22 04/14/22 04/14/22





  09:11 09:11 20:19


 


WBC  22.0 H  


 


RBC  3.64 L  


 


Hgb  11.5 L  


 


Hct  35.2 L  


 


Neutrophils #  18.1 H  


 


Monocytes #  1.4 H  


 


Sodium   


 


Carbon Dioxide   31 H 


 


BUN   36 H 


 


Creatinine   1.33 H 


 


Glucose   101 H 


 


POC Glucose (mg/dL)    136 H


 


Total Protein   6.2 L 


 


Albumin   3.1 L 














  04/15/22 04/15/22 04/16/22





  08:30 08:30 08:14


 


WBC  18.5 H   17.8 H


 


RBC  3.56 L   3.38 L


 


Hgb  11.1 L   11.0 L


 


Hct  35.0 L   33.0 L


 


Neutrophils #  14.7 H   15.1 H


 


Monocytes #  1.4 H   1.1 H


 


Sodium   


 


Carbon Dioxide   32 H 


 


BUN   34 H 


 


Creatinine   1.38 H 


 


Glucose   120 H 


 


POC Glucose (mg/dL)   


 


Total Protein   


 


Albumin   














  04/16/22





  08:14


 


WBC 


 


RBC 


 


Hgb 


 


Hct 


 


Neutrophils # 


 


Monocytes # 


 


Sodium  135 L


 


Carbon Dioxide  31 H


 


BUN  33 H


 


Creatinine  1.27 H


 


Glucose  121 H


 


POC Glucose (mg/dL) 


 


Total Protein 


 


Albumin 














- Diagnostic Findings


Chest x-ray: image reviewed





Assessment and Plan


Assessment: 





1 Acute exacerbation of diastolic congestive heart failure.  Chest x-ray shows 

improvement post diuretics.





2 Dyspnea on exertion secondary to above, cannot completely rule out community-

acquired pneumonia of the right lung base. Procalcitonin pending





3 Coronary artery disease with previous stent placement





4 Hypertension





5 Hyperlipidemia





6 Chronic lymphocytic leukemia





Plan:





The patient was seen and evaluated


Chest x-rays, labs and medications reviewed


Pneumonia less likely based on 24-hour resolution of x-ray findings


We'll continue antibiotics for now, pro-calcitonin pending


Continue diuretics


Titrate the FiO2 as tolerated


We will continue to follow and make further recommendations based on his 

clinical status.





I have personally seen and examined the patient, performed the documentation and

the assessment and plan as written.  Number of minutes spent on the visit: 20.

## 2022-04-16 NOTE — P.PN
Subjective


Progress Note Date: 04/16/22


Patient reports minimal improvement in his dyspnea especially with exertion.  

Denies any chest pain, cough is improving.  He has been afebrile, vital signs 

have been stable.  Lower extremity swelling is decreasing








Objective





- Vital Signs


Vital signs: 


                                   Vital Signs











Temp  97.8 F   04/16/22 12:00


 


Pulse  77   04/16/22 12:00


 


Resp  18   04/16/22 12:00


 


BP  138/64   04/16/22 12:00


 


Pulse Ox  98   04/16/22 12:00








                                 Intake & Output











 04/15/22 04/16/22 04/16/22





 18:59 06:59 18:59


 


Intake Total 480 190 660


 


Balance 480 190 660


 


Weight 104.326 kg  


 


Intake:   


 


  IV  10 


 


    Invasive Line 1  10 


 


  Oral 480 180 660


 


Other:   


 


  Voiding Method Urinal Toilet Toilet





  Urinal Urinal


 


  # Voids  1 














- Constitutional


General appearance: Present: cooperative, no acute distress, obese





- EENT


Eyes: Present: EOMI, PERRLA





- Neck


Neck: Present: normal ROM





- Respiratory


Respiratory: bilateral: diminished, negative: wheezing





- Cardiovascular


Rhythm: regular


Heart sounds: normal: S1, S2


Abnormal Heart Sounds: Absent: systolic murmur, diastolic murmur





- Peripheral edema


  ** foot


Peripheral Edema: bilateral: 2+





- Neurologic


Neurologic: Present: CNII-XII intact





- Musculoskeletal


Musculoskeletal: Present: strength equal bilaterally





- Psychiatric


Psychiatric: Present: A&O x's 3, appropriate affect





- Labs


CBC & Chem 7: 


                                 04/16/22 08:14





                                 04/16/22 08:14


Labs: 


                  Abnormal Lab Results - Last 24 Hours (Table)











  04/16/22 04/16/22 Range/Units





  08:14 08:14 


 


WBC  17.8 H   (3.8-10.6)  k/uL


 


RBC  3.38 L   (4.30-5.90)  m/uL


 


Hgb  11.0 L   (13.0-17.5)  gm/dL


 


Hct  33.0 L   (39.0-53.0)  %


 


Neutrophils #  15.1 H   (1.3-7.7)  k/uL


 


Monocytes #  1.1 H   (0-1.0)  k/uL


 


Sodium   135 L  (137-145)  mmol/L


 


Carbon Dioxide   31 H  (22-30)  mmol/L


 


BUN   33 H  (9-20)  mg/dL


 


Creatinine   1.27 H  (0.66-1.25)  mg/dL


 


Glucose   121 H  (74-99)  mg/dL








                      Microbiology - Last 24 Hours (Table)











 04/14/22 11:00 Blood Culture - Preliminary





 Blood    No Growth after 48 hours


 


 04/14/22 10:45 Blood Culture - Preliminary





 Blood    No Growth after 48 hours














Assessment and Plan


Plan: 





Acute decompensated heart failure


- With preserved ejection fraction


-Lasix changed back to IV due to decreased UOP


- Monitor intake and output, BMP, daily weights


- Fluid restriction to less than 1500 mL


- Amlodipine discontinued as it may contribute to lower extremity edema


-Patient counseled extensively on importance of fluid and salt restriction





Community acquired pneumonia


-Improving


- IV ceftriaxone and IV azithromycin to cover empirically


- Sputum culture, Legionella antigen pending





Coronary artery disease


- Continue home medications aspirin and atorvastatin


- Continue home medication carvedilol


-Coronary artery disease s/p PCI x 2 to the mid LAD in 2006








Hypertension


- Continue Coreg, hydralazine 





 Obesity


- Lifestyle modification and dietary changes as able





History of Leukemia


follows with Dr. Capps group





Anticipated discharge home in 24-48 hours


Time with Patient: Less than 30

## 2022-04-16 NOTE — PN
PROGRESS NOTE



This is a gentleman who came in with pneumonia and also some diastolic heart failure.

He continues to be short of breath. Vitals are stable. JVD is 1 cm.  No carotid bruit.

S1-S2 heard normally. Short systolic murmur noted. Lungs reveal scattered rales and

diminished breath sounds. Abdomen and lower extremity exam unchanged. Plan is to

continue current cardiac medications, including oral Lasix, but I will request

pulmonary evaluation in view of his pneumonia and hypoxia.





MMODL / IJN: [id number] / Job#: [id number]

## 2022-04-17 LAB
ANION GAP SERPL CALC-SCNC: 4 MMOL/L
BASOPHILS # BLD AUTO: 0 K/UL (ref 0–0.2)
BASOPHILS NFR BLD AUTO: 0 %
BUN SERPL-SCNC: 35 MG/DL (ref 9–20)
CALCIUM SPEC-MCNC: 8.6 MG/DL (ref 8.4–10.2)
CHLORIDE SERPL-SCNC: 98 MMOL/L (ref 98–107)
CO2 SERPL-SCNC: 32 MMOL/L (ref 22–30)
EOSINOPHIL # BLD AUTO: 0.4 K/UL (ref 0–0.7)
EOSINOPHIL NFR BLD AUTO: 2 %
ERYTHROCYTE [DISTWIDTH] IN BLOOD BY AUTOMATED COUNT: 3.5 M/UL (ref 4.3–5.9)
ERYTHROCYTE [DISTWIDTH] IN BLOOD: 13.4 % (ref 11.5–15.5)
GLUCOSE SERPL-MCNC: 107 MG/DL (ref 74–99)
HCT VFR BLD AUTO: 34.6 % (ref 39–53)
HGB BLD-MCNC: 11.2 GM/DL (ref 13–17.5)
LYMPHOCYTES # SPEC AUTO: 1 K/UL (ref 1–4.8)
LYMPHOCYTES NFR SPEC AUTO: 6 %
MCH RBC QN AUTO: 32 PG (ref 25–35)
MCHC RBC AUTO-ENTMCNC: 32.4 G/DL (ref 31–37)
MCV RBC AUTO: 98.7 FL (ref 80–100)
MONOCYTES # BLD AUTO: 1.2 K/UL (ref 0–1)
MONOCYTES NFR BLD AUTO: 7 %
NEUTROPHILS # BLD AUTO: 14.6 K/UL (ref 1.3–7.7)
NEUTROPHILS NFR BLD AUTO: 84 %
PLATELET # BLD AUTO: 266 K/UL (ref 150–450)
POTASSIUM SERPL-SCNC: 4.9 MMOL/L (ref 3.5–5.1)
SODIUM SERPL-SCNC: 134 MMOL/L (ref 137–145)
WBC # BLD AUTO: 17.4 K/UL (ref 3.8–10.6)

## 2022-04-17 RX ADMIN — AZITHROMYCIN MONOHYDRATE SCH MLS/HR: 500 INJECTION, POWDER, LYOPHILIZED, FOR SOLUTION INTRAVENOUS at 14:36

## 2022-04-17 RX ADMIN — FUROSEMIDE SCH MG: 20 TABLET ORAL at 09:42

## 2022-04-17 RX ADMIN — ASPIRIN SCH MG: 325 TABLET ORAL at 09:47

## 2022-04-17 RX ADMIN — ATORVASTATIN CALCIUM SCH MG: 40 TABLET, FILM COATED ORAL at 09:41

## 2022-04-17 RX ADMIN — FUROSEMIDE SCH MG: 20 TABLET ORAL at 15:48

## 2022-04-17 RX ADMIN — Medication SCH MG: at 09:41

## 2022-04-17 RX ADMIN — SPIRONOLACTONE SCH MG: 25 TABLET, FILM COATED ORAL at 09:41

## 2022-04-17 RX ADMIN — ASPIRIN 81 MG CHEWABLE TABLET SCH MG: 81 TABLET CHEWABLE at 09:41

## 2022-04-17 RX ADMIN — ENOXAPARIN SODIUM SCH MG: 40 INJECTION SUBCUTANEOUS at 09:42

## 2022-04-17 NOTE — P.PN
Subjective


Progress Note Date: 04/17/22


Principal diagnosis: 





Acute exacerbation of chronic diastolic congestive heart failure, doubt 

pneumonia.





This is an 82-year-old male patient who has a history of chronic lymphocytic 

leukemia, hypertension, hyperlipidemia, coronary artery disease with previous 

stent placement.  Nonsmoker.  He had presented to the emergency room back on 

04/14/2022 with complaints of increasing shortness of breath over the past month

worsening the past 2 weeks with significant dyspnea on exertion and swelling of 

the lower extremities.  X-ray revealed a right lower lobe infiltrate with small 

pleural effusion.  EKG reveals sinus rhythm with no significant ST or T wave 

abnormalities.  Echocardiogram revealed preserved left ventricular systolic 

function with ejection fraction 66 5%.  Mild pulmonary hypertension.  No 

significant valvular abnormalities.  Dopplers of the lower extremity were ne

gative for DVT.  The cultures revealing no growth to date.  White count 17.8.  

Hemoglobin 11.0.  Sodium 135.  Potassium 4.8.  Bicarb 31.  BUN 33.  Creatinine 

1.27.  Glucose 121.  ProBNP 2670.  Influenza screen negative.  RSV screen 

negative.  COVID-19 screen negative.  The patient was treated with diuretics.  

Follow-up chest x-ray revealed improved bilateral lower lobe infiltrate and 

small effusion.  He is seen today in consultation on the selective care unit.  

Awake and alert in no acute distress.  He is sitting up in a chair at the 

bedside.  Maintaining O2 saturations in the 90s on 2 L/m per nasal cannula.  

Previously documented 96% on room air.  He's afebrile.  Hemodynamically stable.





Patient was reevaluated today on 4/17/2022, patient is feeling a bit better, 

nonetheless continues to have shortness of breath with any activity.  Follow-up 

chest x-ray since admission showed improvement in his bilateral interstitial 

edema and small effusion, pro-calcitonin level was not impressive and not 

consistent with pneumonia.  Patient has been receiving diuretics by cardiology, 

his renal functioning worsened with diuretics, hence that diuretics dose has 

been cut down by cardiology.  Still concerned about his shortness of breath was 

relatively unremarkable chest x-ray/follow-up chest x-ray, hence I'm 

recommending a VQ scan on this patient.  He did have a venous Doppler on 

admission and it was negative.  WBC count today is 17.4 hemoglobin is 11.2 

electrolytes are normal however renal profile is worse with BUN of 35 creatinine

1.65.  Pro-calcitonin level is 0.1.











Objective





- Vital Signs


Vital signs: 


                                   Vital Signs











Temp  97.6 F   04/17/22 08:00


 


Pulse  80   04/17/22 08:00


 


Resp  19   04/17/22 08:00


 


BP  132/63   04/17/22 08:00


 


Pulse Ox  95   04/17/22 08:00








                                 Intake & Output











 04/16/22 04/17/22 04/17/22





 18:59 06:59 18:59


 


Intake Total 660 10 180


 


Balance 660 10 180


 


Intake:   


 


  IV  10 


 


    Invasive Line 1  10 


 


  Oral 660  180


 


Other:   


 


  Voiding Method Toilet Toilet Toilet





 Urinal Urinal Urinal


 


  # Voids 2 2 


 


  # Bowel Movements  1 














- Exam





GENERAL EXAM: Revealed 82-year-old white male on room air, in no distress.


HEAD: Normocephalic.


EYES: Normal reaction of pupils, equal size.


NOSE: Clear with pink turbinates.


THROAT: No erythema or exudates.


NECK: No masses, no JVD.


CHEST: No chest wall deformity.


LUNGS: Diminished breath sounds at the bases no crackles or rhonchi or wheezes 

today.


CVS: S1 and S2 normal with no audible murmur, regular rhythm.


ABDOMEN: No hepatosplenomegaly, normal bowel sounds, no guarding or rigidity.


Psychiatric: Normal mood affect and normal mental status examination.


SKIN: No rashes


CENTRAL NERVOUS SYSTEM: Alert and oriented 3 no gross focal deficits.


EXTREMITIES: Trace of bipedal edema no clubbing no cyanosis.











- Labs


CBC & Chem 7: 


                                 04/17/22 07:36





                                 04/17/22 07:36


Labs: 


                  Abnormal Lab Results - Last 24 Hours (Table)











  04/16/22 04/17/22 04/17/22 Range/Units





  08:14 07:36 07:36 


 


WBC   17.4 H   (3.8-10.6)  k/uL


 


RBC   3.50 L   (4.30-5.90)  m/uL


 


Hgb   11.2 L   (13.0-17.5)  gm/dL


 


Hct   34.6 L   (39.0-53.0)  %


 


Neutrophils #   14.6 H   (1.3-7.7)  k/uL


 


Monocytes #   1.2 H   (0-1.0)  k/uL


 


Sodium    134 L  (137-145)  mmol/L


 


Carbon Dioxide    32 H  (22-30)  mmol/L


 


BUN    35 H  (9-20)  mg/dL


 


Creatinine    1.65 H  (0.66-1.25)  mg/dL


 


Glucose    107 H  (74-99)  mg/dL


 


Procalcitonin  0.10 H    (0.02-0.09)  ng/mL








                      Microbiology - Last 24 Hours (Table)











 04/14/22 11:00 Blood Culture - Preliminary





 Blood    No Growth after 48 hours


 


 04/14/22 10:45 Blood Culture - Preliminary





 Blood    No Growth after 48 hours














Assessment and Plan


Assessment: 





1 Acute exacerbation of diastolic congestive heart failure.  Chest x-ray shows 

improvement post diuretics.





2 Dyspnea on exertion secondary to above, doubt pneumonia considering his pro-

concern level is relatively normal.





3 Coronary artery disease with previous stent placement





4 Hypertension





5 Hyperlipidemia





6 Chronic lymphocytic leukemia





7 considering the patient's symptoms with relatively unremarkable chest x-ray 

and his profound dyspnea on exertion I'm recommending a VQ scan.





Recommendation:


Continue diuretics


Continue empiric antibiotics


VQ scan to be done today.


Monitor renal profile daily.


Not quite ready for discharge planning at this point, consider discharge 

planning in the next 24 hours.


Will continue to follow.








Time with Patient: Less than 30

## 2022-04-17 NOTE — NM
EXAMINATION TYPE: NM pul perfusion

 

DATE OF EXAM: 4/17/2022

 

COMPARISON: NONE

 

HISTORY: shortness of breath 

 

 

Following administration of 5.3 mCi Tc 99m MAA.  Images obtained post injection.

 

FINDINGS: 

There is a single wedge-shaped defect right lower lobe at its periphery. There is otherwise homogeneo
us distribution radiotracer on the perfusion only study.

 

IMPRESSION: 

Intermediate probability for pulmonary embolism.

## 2022-04-17 NOTE — PN
PROGRESS NOTE



Mr. Burroughs is a patient with some diastolic heart failure and pneumonia.  He has been

receiving Lasix and creatinine has gone up.  I am recommending that we discontinue IV

Lasix, place him on 20 mg p.o. b.i.d., add Aldactone 12.5 mg daily, increase activity,

and he can be discharged whenever okay by the admitting doctor.  His main problem here

is pneumonia and mild diastolic failure.  His systolic function is excellent.  Vitals

are stable. JVD is 1 cm. No carotid bruit. S1-S2 heard normally. Improved air entry,

bilateral lung fields. Abdomen and lower extremity exam unchanged.  Edema has improved.

Lasix has been switched to p.o. and I added a small dose of Aldactone.





MMODL / IJN: [id number] / Job#: [id number]

## 2022-04-17 NOTE — P.PN
Subjective


Progress Note Date: 04/17/22


Patient reports feeling better today but he still complaining of shortness of 

breath with activity.  Repeat chest x-ray shows improvement in the interstitial 

edema.  He has been receiving IV antibiotics for the last 3 days, he received 1 

dose of IV Lasix yesterday but this was switched back to oral Lasix this morning

as his creatinine slightly increased given the concern for his persistent 

shortness of breath and relatively unremarkable radiographic findings, a VQ scan

was performed which came back as intermediate probability for a PE.  This 

unfortunately is not very helpful at the moment.  May consider CTA in the 

morning if his renal function improves or if d-dimer significant elevated








Objective





- Vital Signs


Vital signs: 


                                   Vital Signs











Temp  97.9 F   04/17/22 16:00


 


Pulse  77   04/17/22 16:00


 


Resp  17   04/17/22 16:00


 


BP  144/65   04/17/22 16:00


 


Pulse Ox  95   04/17/22 16:00








                                 Intake & Output











 04/16/22 04/17/22 04/17/22





 18:59 06:59 18:59


 


Intake Total 660 10 580


 


Balance 660 10 580


 


Intake:   


 


  IV  10 


 


    Invasive Line 1  10 


 


  Oral 660  580


 


Other:   


 


  Voiding Method Toilet Toilet Toilet





 Urinal Urinal Urinal


 


  # Voids 2 2 2


 


  # Bowel Movements  1 














- Exam





HEENT: Head is normocephalic. Pupils are equal, round. Sclerae anicteric. Mucous

membranes of the mouth are moist.  No JVD. 


CHEST EXAMINATION: Lungs are diminished in the bases, no crackles or wheezing.  

No chest wall tenderness is noted on palpation or with deep breathing. 


HEART EXAMINATION: Regular rate and rhythm. S1, S2 heard. Systolic ejection 

murmur heard at base and apex 


ABDOMEN: Soft, nontender. Positive bowel sounds.


EXTREMITIES: Trace bilateral lower extremity edema and no calf tenderness.


NEUROLOGIC EXAMINATION: Patient is awake, alert and oriented x3.





- Labs


CBC & Chem 7: 


                                 04/17/22 07:36





                                 04/17/22 07:36


Labs: 


                  Abnormal Lab Results - Last 24 Hours (Table)











  04/17/22 04/17/22 Range/Units





  07:36 07:36 


 


WBC  17.4 H   (3.8-10.6)  k/uL


 


RBC  3.50 L   (4.30-5.90)  m/uL


 


Hgb  11.2 L   (13.0-17.5)  gm/dL


 


Hct  34.6 L   (39.0-53.0)  %


 


Neutrophils #  14.6 H   (1.3-7.7)  k/uL


 


Monocytes #  1.2 H   (0-1.0)  k/uL


 


Sodium   134 L  (137-145)  mmol/L


 


Carbon Dioxide   32 H  (22-30)  mmol/L


 


BUN   35 H  (9-20)  mg/dL


 


Creatinine   1.65 H  (0.66-1.25)  mg/dL


 


Glucose   107 H  (74-99)  mg/dL








                      Microbiology - Last 24 Hours (Table)











 04/14/22 11:00 Blood Culture - Preliminary





 Blood    No Growth after 72 hours


 


 04/14/22 10:45 Blood Culture - Preliminary





 Blood    No Growth after 72 hours














Assessment and Plan


Plan: 





Acute decompensated heart failure


- With preserved ejection fraction


-Lasix changed to by mouth after creatinine slightly increased


- Monitor intake and output, BMP, daily weights


- Fluid restriction to less than 1500 mL


- Amlodipine discontinued as it may contribute to lower extremity edema


-Patient counseled extensively on importance of fluid and salt restriction


-Patient further recommendations from cardiology





AMINTA


-Current is having increased 1.7 today


-Most likely secondary to overdiuresis


-IV Lasix discontinued


-Avoid nephrotoxins


-Repeat BMP in the morning








Community acquired pneumonia


-Improving


-Although this appears to be less likely given the low pro-calcitonin


- Continue IV ceftriaxone and IV azithromycin to cover empirically


-Cultures negative to date


-Given his persistent dyspnea despite the lack of convincing evidence for 

pneumonia and improving CHF.  A VQ scan was ordered which came back as 

intermediate probability


-This unfortunately does not give a definitive diagnosis.  If renal function 

improves tomorrow, CTA of the chest can be considered.  Check d-dimer





Coronary artery disease


- Continue home medications aspirin and atorvastatin


- Continue home medication carvedilol


-Coronary artery disease s/p PCI x 2 to the mid LAD in 2006








Hypertension


- Continue Coreg, hydralazine 





 Obesity


- Lifestyle modification and dietary changes as able





History of Leukemia


follows with Dr. Capps [facility]





Anticipated discharge home in 24-48 hours


Time with Patient: Greater than 30

## 2022-04-18 LAB
ALBUMIN SERPL-MCNC: 2.8 G/DL (ref 3.5–5)
ALP SERPL-CCNC: 72 U/L (ref 38–126)
ALT SERPL-CCNC: 19 U/L (ref 4–49)
ANION GAP SERPL CALC-SCNC: 2 MMOL/L
APTT BLD: 26.7 SEC (ref 22–30)
AST SERPL-CCNC: 30 U/L (ref 17–59)
BASOPHILS # BLD AUTO: 0 K/UL (ref 0–0.2)
BASOPHILS NFR BLD AUTO: 0 %
BUN SERPL-SCNC: 35 MG/DL (ref 9–20)
CALCIUM SPEC-MCNC: 8.9 MG/DL (ref 8.4–10.2)
CHLORIDE SERPL-SCNC: 98 MMOL/L (ref 98–107)
CO2 SERPL-SCNC: 34 MMOL/L (ref 22–30)
EOSINOPHIL # BLD AUTO: 0.5 K/UL (ref 0–0.7)
EOSINOPHIL NFR BLD AUTO: 2 %
ERYTHROCYTE [DISTWIDTH] IN BLOOD BY AUTOMATED COUNT: 3.63 M/UL (ref 4.3–5.9)
ERYTHROCYTE [DISTWIDTH] IN BLOOD: 13.5 % (ref 11.5–15.5)
GLUCOSE SERPL-MCNC: 98 MG/DL (ref 74–99)
HCT VFR BLD AUTO: 35.7 % (ref 39–53)
HGB BLD-MCNC: 11.6 GM/DL (ref 13–17.5)
INR PPP: 0.9 (ref ?–1.2)
LYMPHOCYTES # SPEC AUTO: 1.3 K/UL (ref 1–4.8)
LYMPHOCYTES NFR SPEC AUTO: 7 %
MAGNESIUM SPEC-SCNC: 2.2 MG/DL (ref 1.6–2.3)
MCH RBC QN AUTO: 32 PG (ref 25–35)
MCHC RBC AUTO-ENTMCNC: 32.6 G/DL (ref 31–37)
MCV RBC AUTO: 98.2 FL (ref 80–100)
MONOCYTES # BLD AUTO: 1.2 K/UL (ref 0–1)
MONOCYTES NFR BLD AUTO: 6 %
NEUTROPHILS # BLD AUTO: 16.1 K/UL (ref 1.3–7.7)
NEUTROPHILS NFR BLD AUTO: 84 %
PLATELET # BLD AUTO: 299 K/UL (ref 150–450)
POTASSIUM SERPL-SCNC: 5.1 MMOL/L (ref 3.5–5.1)
PROT SERPL-MCNC: 5.7 G/DL (ref 6.3–8.2)
PT BLD: 10.3 SEC (ref 9–12)
SODIUM SERPL-SCNC: 134 MMOL/L (ref 137–145)
WBC # BLD AUTO: 19.3 K/UL (ref 3.8–10.6)

## 2022-04-18 RX ADMIN — ONDANSETRON PRN MG: 2 INJECTION INTRAMUSCULAR; INTRAVENOUS at 11:56

## 2022-04-18 RX ADMIN — FUROSEMIDE SCH MG: 20 TABLET ORAL at 10:23

## 2022-04-18 RX ADMIN — FUROSEMIDE SCH MG: 10 INJECTION, SOLUTION INTRAMUSCULAR; INTRAVENOUS at 17:23

## 2022-04-18 RX ADMIN — HEPARIN SODIUM SCH UNIT: 5000 INJECTION INTRAVENOUS; SUBCUTANEOUS at 17:23

## 2022-04-18 RX ADMIN — Medication SCH MG: at 10:23

## 2022-04-18 RX ADMIN — ASPIRIN 81 MG CHEWABLE TABLET SCH MG: 81 TABLET CHEWABLE at 10:22

## 2022-04-18 RX ADMIN — HEPARIN SODIUM SCH UNIT: 5000 INJECTION INTRAVENOUS; SUBCUTANEOUS at 23:39

## 2022-04-18 RX ADMIN — ATORVASTATIN CALCIUM SCH MG: 40 TABLET, FILM COATED ORAL at 10:22

## 2022-04-18 RX ADMIN — ASPIRIN SCH MG: 325 TABLET ORAL at 11:46

## 2022-04-18 RX ADMIN — SPIRONOLACTONE SCH MG: 25 TABLET, FILM COATED ORAL at 10:22

## 2022-04-18 RX ADMIN — ENOXAPARIN SODIUM SCH: 40 INJECTION SUBCUTANEOUS at 10:41

## 2022-04-18 RX ADMIN — FUROSEMIDE SCH MG: 10 INJECTION, SOLUTION INTRAMUSCULAR; INTRAVENOUS at 20:32

## 2022-04-18 NOTE — P.PN
Patient still having a lot of pain. Subjective





Patient was examined at bedside today not complaining of any new symptomatology.

 He continues to complain of shortness of breath which has been persistent for 

many weeks.  He denies any orthopnea, proximal nocturnal dyspnea.  He is unable 

to walk a block without getting short of breath.  This is my first with the 

patient unfortunately was concern regarding transferring the care to another 

facility as per son.  Patient is agreeable with our management currently.  He 

denies any active chest pain, however does endorse a significant weight gain of 

approximately 37 pounds.  His baseline weight is 212 pounds.  V/Q scan was also 

reviewed spoke with pulmonary as well pt does not have any immediate risk for 

PE, they are also agreeable to stop  given his well's score as well as a 

sensitivity of the V/Q scan itself which is showing intermediate.  I discussed 

all these findings with the patient and he is agreeable.  Also communicated with

RN present at bedside.





Objective





- Vital Signs


Vital signs: 


                                   Vital Signs











Temp  98.2 F   04/18/22 11:56


 


Pulse  77   04/18/22 11:56


 


Resp  18   04/18/22 11:56


 


BP  155/74   04/18/22 11:56


 


Pulse Ox  95   04/18/22 11:56








                                 Intake & Output











 04/17/22 04/18/22 04/18/22





 18:59 06:59 18:59


 


Intake Total 580 10 18.554


 


Output Total  300 


 


Balance 580 -290 18.554


 


Weight  108.5 kg 


 


Intake:   


 


  IV  10 


 


    Invasive Line 1  10 


 


  Intake, IV Titration   18.554





  Amount   


 


    Heparin Sod,Pork in 0.45%   18.554





    NaCl 25,000 unit In 0.45   





    % NaCl 1 250ml.bag @ 18   





    UNITS/KG/HR 19.53 mls/hr   





    IV .[id number] [facility] Rx#:   





    [id number]   


 


  Oral 580  


 


Output:   


 


  Urine  300 


 


Other:   


 


  Voiding Method Toilet Toilet 





 Urinal Urinal 


 


  # Voids 2 3 














- Exam





GENERAL: AAO X3


HEENT: Head is normocephalic. Pupils are equal, round. Sclerae anicteric. Mucous

membranes of the mouth are moist.  No JVD. 


CHEST EXAMINATION: Lungs are diminished in the bases, very slight crackles 

appreciated no wheezing.  No chest wall tenderness is noted on palpation or with

deep breathing. 


HEART EXAMINATION: Regular rate and rhythm. S1, S2 heard. Systolic ejection 

murmur heard at base and apex 


ABDOMEN: Soft, nontender. Positive bowel sounds.


EXTREMITIES: +2 pitting danielle b/l


NEUROLOGIC EXAMINATION: Patient is awake, alert and oriented x3.





- Labs


CBC & Chem 7: 


                                 04/18/22 06:32





                                 04/18/22 06:32


Labs: 


                  Abnormal Lab Results - Last 24 Hours (Table)











  04/18/22 04/18/22 04/18/22 Range/Units





  06:32 06:32 06:32 


 


WBC  19.3 H    (3.8-10.6)  k/uL


 


RBC  3.63 L    (4.30-5.90)  m/uL


 


Hgb  11.6 L    (13.0-17.5)  gm/dL


 


Hct  35.7 L    (39.0-53.0)  %


 


Neutrophils #  16.1 H    (1.3-7.7)  k/uL


 


Monocytes #  1.2 H    (0-1.0)  k/uL


 


D-Dimer   4.14 H   (<0.60)  mg/L FEU


 


Sodium    134 L  (137-145)  mmol/L


 


Carbon Dioxide    34 H  (22-30)  mmol/L


 


BUN    35 H  (9-20)  mg/dL


 


Creatinine    1.74 H  (0.66-1.25)  mg/dL


 


Total Protein    5.7 L  (6.3-8.2)  g/dL


 


Albumin    2.8 L  (3.5-5.0)  g/dL








                      Microbiology - Last 24 Hours (Table)











 04/14/22 11:00 Blood Culture - Preliminary





 Blood    No Growth after 96 hours


 


 04/14/22 10:45 Blood Culture - Preliminary





 Blood    No Growth after 96 hours














Assessment and Plan


Assessment: 








Assessment/plan





Congestive heart failure with preserved ejection fraction - NYHA class III stage

C


-Patient has significant weight gain of approximately 37 pounds.  Baseline 

weight is 212 pounds as per patient.  


-Strict input/output, low sodium diet, 30 stricture less than 1.4 L.


-Patient further recommendations from cardiology


-Elevated proBNP trend for only prognostic factors 





Acute kidney injury most likely cardiorenal syndrome


-Creatinine currently trending up 1.74


-Appreciate nephrology's recommendations.


-Continue with diuresis IV Lasix 20 mg twice a day anticipate creatinine slowly 

trended down  


-Repeat BMP in the morning





Community acquired pneumonia


-Continue IV ceftriaxone and IV azithromycin to cover empirically complete 7 

days 


-Cultures negative to date





Acute hypoxic respiratory distress secondary to above multifactorial congestive 

heart failure/CAPD 


-Maintain saturations above 90%, encourage incentive spirometry 


-Compression stockings in the lower extremity, elevate legs at night 


-Repeat chest x-ray 


-Well score low, DVT negative the lower extremity, VQ scan reviewed also 

independently reviewed with pulmonary.  At this time low threshold for PE as a 

patient not tachycardic, tachypneic.  We'll discontinue heparin drip and 

continue to monitor 





Coronary artery disease


-Continue home medications aspirin and atorvastatin


-Continue home medication carvedilol


-Coronary artery disease s/p PCI x 2 to the mid LAD in 2006





Hypertension


-Blood pressure goal of less than 130/80





 Obesity


-Lifestyle modification and dietary changes as able





History of Leukemia


follows with Dr. Génesis chen





DVT prophylaxis heparin 3 times a day

## 2022-04-18 NOTE — P.PN
Subjective


Progress Note Date: 04/18/22


Principal diagnosis: 





Shortness of breath.





This is an 82-year-old male patient who has a history of chronic lymphocytic 

leukemia, hypertension, hyperlipidemia, coronary artery disease with previous 

stent placement.  Nonsmoker.  He had presented to the emergency room back on 

04/14/2022 with complaints of increasing shortness of breath over the past month

worsening the past 2 weeks with significant dyspnea on exertion and swelling of 

the lower extremities.  X-ray revealed a right lower lobe infiltrate with small 

pleural effusion.  EKG reveals sinus rhythm with no significant ST or T wave 

abnormalities.  Echocardiogram revealed preserved left ventricular systolic 

function with ejection fraction 66 5%.  Mild pulmonary hypertension.  No 

significant valvular abnormalities.  Dopplers of the lower extremity were 

negative for DVT.  The cultures revealing no growth to date.  White count 17.8. 

Hemoglobin 11.0.  Sodium 135.  Potassium 4.8.  Bicarb 31.  BUN 33.  Creatinine 

1.27.  Glucose 121.  ProBNP 2670.  Influenza screen negative.  RSV screen 

negative.  COVID-19 screen negative.  The patient was treated with diuretics.  

Follow-up chest x-ray revealed improved bilateral lower lobe infiltrate and 

small effusion.  He is seen today in consultation on the selective care unit.  

Awake and alert in no acute distress.  He is sitting up in a chair at the 

bedside.  Maintaining O2 saturations in the 90s on 2 L/m per nasal cannula.  

Previously documented 96% on room air.  He's afebrile.  Hemodynamically stable.





Patient was reevaluated today on 4/17/2022, patient is feeling a bit better, 

nonetheless continues to have shortness of breath with any activity.  Follow-up 

chest x-ray since admission showed improvement in his bilateral interstitial 

edema and small effusion, pro-calcitonin level was not impressive and not 

consistent with pneumonia.  Patient has been receiving diuretics by cardiology, 

his renal functioning worsened with diuretics, hence that diuretics dose has 

been cut down by cardiology.  Still concerned about his shortness of breath was 

relatively unremarkable chest x-ray/follow-up chest x-ray, hence I'm 

recommending a VQ scan on this patient.  He did have a venous Doppler on 

admission and it was negative.  WBC count today is 17.4 hemoglobin is 11.2 el

ectrolytes are normal however renal profile is worse with BUN of 35 creatinine 

1.65.  Pro-calcitonin level is 0.1.








Progress note dated 04/18/2022.





Currently, the patient sitting at the bedside.  He's on room air.  Is not 

receiving any IV fluids.  He was started on a heparin drip, for possible pul

monary embolism.  He had a perfusion lung scan which is interpreted to be 

indeterminate for pulmonary embolism.  He did not have the ventilation portion 

of the scan.  His pro-calcitonin level is 0.10.  Dopplers of lower extremities 

were negative.  The patient apparently is considering transfer to an outside 

hospital.  This was according to the nurses spoke to the patient's son, who 

apparently is an ICU nurse.  Clinically, the patient looks stable.  White count 

19.3, hemoglobin 11.6, hematocrit 35.7, and platelet count normal.  D-dimer was 

4.14.  Sodium 134, potassium 5.1, chlorides 98, CO2 34, BUN 35, creatinine 1.74.

 Albumin was 2.8.  The perfusion lung scan was positive for a small wedge-shaped

defect in the right lower lung zone.  Again there was no ventilation portion to 

the scan.





Objective





- Vital Signs


Vital signs: 


                                   Vital Signs











Temp  98.2 F   04/18/22 11:56


 


Pulse  77   04/18/22 11:56


 


Resp  18   04/18/22 11:56


 


BP  155/74   04/18/22 11:56


 


Pulse Ox  95   04/18/22 11:56








                                 Intake & Output











 04/17/22 04/18/22 04/18/22





 18:59 06:59 18:59


 


Intake Total 580 10 18.554


 


Output Total  300 


 


Balance 580 -290 18.554


 


Weight  108.5 kg 


 


Intake:   


 


  IV  10 


 


    Invasive Line 1  10 


 


  Intake, IV Titration   18.554





  Amount   


 


    Heparin Sod,Pork in 0.45%   18.554





    NaCl 25,000 unit In 0.45   





    % NaCl 1 250ml.bag @ 18   





    UNITS/KG/HR 19.53 mls/hr   





    IV .[id number] [facility] Rx#:   





    [id number]   


 


  Oral 580  


 


Output:   


 


  Urine  300 


 


Other:   


 


  Voiding Method Toilet Toilet 





 Urinal Urinal 


 


  # Voids 2 3 














- Exam





No acute distress, oriented 3.  No respiratory distress, conversational 

dyspnea, or audible wheezing.





HEENT examination is grossly unremarkable.  





Neck supple.  Full range of motion.  No adenopathy thyromegaly or neck vein 

distention.





Cardiovascular examination reveals regular rhythm rate.  S1-S2 normal.  No S3 or

S4.  Very soft systolic murmur is noted.  Heart rate is 77 bpm.





Lungs essentially clear breath sounds.  Minimal scattered rhonchi.  No wheezes 

or crackles.  Room air saturations between 95 and 97%.  Breath sounds are equal 

bilaterally.





Abdomen soft bowel sounds are heard.  No masses or tenderness.





Extremities are intact.  No cyanosis clubbing or edema.





Skin is without rash or lesion.





Neurologic examination is brief but nonfocal.





- Labs


CBC & Chem 7: 


                                 04/18/22 06:32





                                 04/18/22 06:32


Labs: 


                  Abnormal Lab Results - Last 24 Hours (Table)











  04/18/22 04/18/22 04/18/22 Range/Units





  06:32 06:32 06:32 


 


WBC  19.3 H    (3.8-10.6)  k/uL


 


RBC  3.63 L    (4.30-5.90)  m/uL


 


Hgb  11.6 L    (13.0-17.5)  gm/dL


 


Hct  35.7 L    (39.0-53.0)  %


 


Neutrophils #  16.1 H    (1.3-7.7)  k/uL


 


Monocytes #  1.2 H    (0-1.0)  k/uL


 


D-Dimer   4.14 H   (<0.60)  mg/L FEU


 


Sodium    134 L  (137-145)  mmol/L


 


Carbon Dioxide    34 H  (22-30)  mmol/L


 


BUN    35 H  (9-20)  mg/dL


 


Creatinine    1.74 H  (0.66-1.25)  mg/dL


 


Total Protein    5.7 L  (6.3-8.2)  g/dL


 


Albumin    2.8 L  (3.5-5.0)  g/dL








                      Microbiology - Last 24 Hours (Table)











 04/14/22 11:00 Blood Culture - Preliminary





 Blood    No Growth after 96 hours


 


 04/14/22 10:45 Blood Culture - Preliminary





 Blood    No Growth after 96 hours














Assessment and Plan


Assessment: 





Shortness of breath, secondary to acute exacerbation of diastolic CHF.





Doubt pneumonia in this patient.





CAD with previous stent placement.





History of hypertension.





History of hyperlipidemia.





Chronic lymphocytic leukemia.





Perfusion lung scan, showing a wedge-shaped defect in the right lower lobe, 

without a corresponding ventilation scan.


Plan: 





Plan dated 04/18/2022.





The patient's perfusion lung scan was read as indeterminate/intermediate 

probability for PE.  The scan showed a small wedge-shaped perfusion defect in 

the right lower lobe.  There is no corresponding ventilation scan.  The patient 

appears not to be any distress.  He seems to be much improved just with 

diuretics.  I do not feel strongly that he needs IV heparin.  Dopplers of the 

lower extremities were negative.  Follow make recommendations where appropriate.

 Prognosis is guarded.  Apparently the patient is going to be transferred to an 

outside facility according to the patient's son.


Time with Patient: Greater than 30

## 2022-04-18 NOTE — P.PN
Subjective


Progress Note Date: 04/18/22





HISTORY OF PRESENT ILLNESS: 





This is a 82 year old male with a past medical history of coronary artery 

disease s/p PCI x 2 to the mid LAD in 2006, hypertension, dyslipidemia, iron de

ficiency anemia, Leukemia.  He follows in the office with Dr. Burgos. We have 

been asked to see the patient in consultation for congestive heart failure. He 

presents with worsening shortness of breath, dyspnea with activity and worsening

bilaterally lower extremity edema. He states about 2 weeks ago he was able to 

perform his daily activities, go for walks, however over the past week his 

symptoms have worsened. He has gained about 10lbs over about a week. He denies 

any orthopnea or PND. He sleeps with 1 pillow. He was recently started on 

ferrous sulfate, but no further medication changes. He denies any increased salt

intake/diet changes. He is a non-smoker. He denies history of diabetes or s

troke. 


He does endorse a cough and diaphoresis yesterday. Denies fever or chills. 





DIAGNOSTICS


-Echo revealed EF 60-65%, mild aortic stenosis, mild mitral regurgitation, mild 

tricuspid regurgitation, mild pulmonary hypertension with RVSP of 40 mmHg


-Chest xray right lower lobe infiltrate, right-sided pleural effusion


-Venous dopplers negative for DVT bilaterally 





4/15/2022


Patient seen and examined at bedside, no acute distress.  He states he had 

increased shortness of breath, difficulty sleeping overnight.  He denies any 

chest pain.  Denies any orthopnea or PND.  His [place] edema has improved. He 

continues to have shortness of breath with activity. 








4/18/2022


Patient examined this morning at the bedside.  Patient denies chest pain or pr

essure.  He reports improvement in his shortness of breath.  He has been 

transitioned over to oral Lasix.  V/Q scan completed reveals intermediate 

probability for PE.  Patient has been started on IV heparin.





PHYSICAL EXAM: 


VITAL SIGNS: Reviewed.


GENERAL: Well-developed in no acute distress. 


NECK: Supple. No JVD or thyromegaly


LUNGS: Respirations even and unlabored. Lungs diminished to auscultation 

bilaterally.


HEART: Regular rate and rhythm.  S1 and S2 heard.


EXTREMITIES: Normal range of motion.  No clubbing or cyanosis.  Peripheral 

pulses intact.  1-2+ lower extremity edema





ASSESSMENT: 


Symptoms of shortness of breath, lower extremity edema, likely due to pneumonia 

and CHF, amlodipine could also be contributing lower extremity edema 


Right lower lobe pneumonia


Acute congestive heart failure with preserved ejection fraction


Intermediate probability for PE per VQ scan


Leukocytosis 


History of Leukemia, follows with Dr. Génesis chen


Coronary artery disease s/p PCI x 2 to the mid LAD in 2006


Hypertension


Dyslipidemia


Recent diagnosis of Iron deficiency anemia 


Valvular heart disease 





PLAN: 


Continue current cardiac medications


Continue oral lasix


Defer anticoagulation for possible PE to internal medicine


No further inpatient recommendations from a cardiac standpoint


We will sign off. Please reconsult if needed.








Nurse practitioner note has been reviewed by physician. Signing provider agrees 

with the documented findings, assessment, and plan of care. 








Objective





- Vital Signs


Vital signs: 


                                   Vital Signs











Temp  98.2 F   04/18/22 11:56


 


Pulse  77   04/18/22 11:56


 


Resp  18   04/18/22 11:56


 


BP  155/74   04/18/22 11:56


 


Pulse Ox  95   04/18/22 11:56








                                 Intake & Output











 04/17/22 04/18/22 04/18/22





 18:59 06:59 18:59


 


Intake Total 580 10 18.554


 


Output Total  300 


 


Balance 580 -290 18.554


 


Weight  108.5 kg 


 


Intake:   


 


  IV  10 


 


    Invasive Line 1  10 


 


  Intake, IV Titration   18.554





  Amount   


 


    Heparin Sod,Pork in 0.45%   18.554





    NaCl 25,000 unit In 0.45   





    % NaCl 1 250ml.bag @ 18   





    UNITS/KG/HR 19.53 mls/hr   





    IV .[id number] [facility] Rx#:   





    [id number]   


 


  Oral 580  


 


Output:   


 


  Urine  300 


 


Other:   


 


  Voiding Method Toilet Toilet 





 Urinal Urinal 


 


  # Voids 2 3 














- Labs


CBC & Chem 7: 


                                 04/18/22 06:32





                                 04/18/22 06:32


Labs: 


                  Abnormal Lab Results - Last 24 Hours (Table)











  04/18/22 04/18/22 04/18/22 Range/Units





  06:32 06:32 06:32 


 


WBC  19.3 H    (3.8-10.6)  k/uL


 


RBC  3.63 L    (4.30-5.90)  m/uL


 


Hgb  11.6 L    (13.0-17.5)  gm/dL


 


Hct  35.7 L    (39.0-53.0)  %


 


Neutrophils #  16.1 H    (1.3-7.7)  k/uL


 


Monocytes #  1.2 H    (0-1.0)  k/uL


 


D-Dimer   4.14 H   (<0.60)  mg/L FEU


 


Sodium    134 L  (137-145)  mmol/L


 


Carbon Dioxide    34 H  (22-30)  mmol/L


 


BUN    35 H  (9-20)  mg/dL


 


Creatinine    1.74 H  (0.66-1.25)  mg/dL


 


Total Protein    5.7 L  (6.3-8.2)  g/dL


 


Albumin    2.8 L  (3.5-5.0)  g/dL








                      Microbiology - Last 24 Hours (Table)











 04/14/22 11:00 Blood Culture - Preliminary





 Blood    No Growth after 96 hours


 


 04/14/22 10:45 Blood Culture - Preliminary





 Blood    No Growth after 96 hours

## 2022-04-19 LAB
ANION GAP SERPL CALC-SCNC: 4 MMOL/L
BASOPHILS # BLD AUTO: 0.1 K/UL (ref 0–0.2)
BASOPHILS NFR BLD AUTO: 0 %
BUN SERPL-SCNC: 37 MG/DL (ref 9–20)
CALCIUM SPEC-MCNC: 8.6 MG/DL (ref 8.4–10.2)
CHLORIDE SERPL-SCNC: 96 MMOL/L (ref 98–107)
CO2 SERPL-SCNC: 33 MMOL/L (ref 22–30)
EOSINOPHIL # BLD AUTO: 0.4 K/UL (ref 0–0.7)
EOSINOPHIL NFR BLD AUTO: 2 %
ERYTHROCYTE [DISTWIDTH] IN BLOOD BY AUTOMATED COUNT: 3.51 M/UL (ref 4.3–5.9)
ERYTHROCYTE [DISTWIDTH] IN BLOOD: 13.4 % (ref 11.5–15.5)
GLUCOSE SERPL-MCNC: 118 MG/DL (ref 74–99)
GRAN CASTS UR QL COMP ASSIST: 1 /LPF
HCT VFR BLD AUTO: 34.8 % (ref 39–53)
HGB BLD-MCNC: 11 GM/DL (ref 13–17.5)
HYALINE CASTS UR QL AUTO: 17 /LPF (ref 0–2)
LYMPHOCYTES # SPEC AUTO: 1 K/UL (ref 1–4.8)
LYMPHOCYTES NFR SPEC AUTO: 5 %
MCH RBC QN AUTO: 31.2 PG (ref 25–35)
MCHC RBC AUTO-ENTMCNC: 31.5 G/DL (ref 31–37)
MCV RBC AUTO: 99.3 FL (ref 80–100)
MONOCYTES # BLD AUTO: 1.2 K/UL (ref 0–1)
MONOCYTES NFR BLD AUTO: 6 %
NEUTROPHILS # BLD AUTO: 17.4 K/UL (ref 1.3–7.7)
NEUTROPHILS NFR BLD AUTO: 86 %
PH UR: 5 [PH] (ref 5–8)
PLATELET # BLD AUTO: 261 K/UL (ref 150–450)
POTASSIUM SERPL-SCNC: 5.2 MMOL/L (ref 3.5–5.1)
PROT UR QL: (no result)
RBC UR QL: 2 /HPF (ref 0–5)
SODIUM SERPL-SCNC: 133 MMOL/L (ref 137–145)
SP GR UR: 1.01 (ref 1–1.03)
SQUAMOUS UR QL AUTO: <1 /HPF (ref 0–4)
UROBILINOGEN UR QL STRIP: <2 MG/DL (ref ?–2)
WBC # BLD AUTO: 20.3 K/UL (ref 3.8–10.6)
WBC #/AREA URNS HPF: 2 /HPF (ref 0–5)

## 2022-04-19 RX ADMIN — ATORVASTATIN CALCIUM SCH MG: 40 TABLET, FILM COATED ORAL at 10:28

## 2022-04-19 RX ADMIN — HEPARIN SODIUM SCH UNIT: 5000 INJECTION INTRAVENOUS; SUBCUTANEOUS at 10:20

## 2022-04-19 RX ADMIN — FUROSEMIDE SCH MG: 10 INJECTION, SOLUTION INTRAMUSCULAR; INTRAVENOUS at 10:20

## 2022-04-19 RX ADMIN — SPIRONOLACTONE SCH MG: 25 TABLET, FILM COATED ORAL at 10:20

## 2022-04-19 RX ADMIN — HEPARIN SODIUM SCH UNIT: 5000 INJECTION INTRAVENOUS; SUBCUTANEOUS at 15:42

## 2022-04-19 RX ADMIN — HEPARIN SODIUM SCH UNIT: 5000 INJECTION INTRAVENOUS; SUBCUTANEOUS at 23:30

## 2022-04-19 RX ADMIN — ASPIRIN SCH MG: 325 TABLET ORAL at 10:29

## 2022-04-19 RX ADMIN — ASPIRIN 81 MG CHEWABLE TABLET SCH MG: 81 TABLET CHEWABLE at 10:28

## 2022-04-19 RX ADMIN — FUROSEMIDE SCH MG: 10 INJECTION, SOLUTION INTRAMUSCULAR; INTRAVENOUS at 20:45

## 2022-04-19 RX ADMIN — Medication SCH MG: at 10:20

## 2022-04-19 NOTE — P.NPCON
History of Present Illness





- Reason for Consult


acute renal failure





- History of Present Illness





Patient is a 82-year-old male with history of coronary artery disease and 

diastolic heart failure.  He is admitted to the hospital with complaints of 

increasing shortness of breath and lower extremity swelling.


Initial chest x-ray showed right lower lobe infiltrate however repeat chest x-

rays are now showing bilateral pleural effusions and bilateral infiltrates.


Patient is maintained on antibiotics and started on IV Lasix yesterday.


24 hour urine output documented at 1075 mL.


Blood pressure is not low





Serum creatinine was 1.38 mg/dL on initial admission and increased to 1.8 today.


Bladder scans were done post void which did not show significant urine 

retention.





Patient has underlying CK D with baseline creatinine around 1.3 mg/dL NKF stage 

IIIa





Review of Systems





As per HPI





Past Medical History


Past Medical History: Coronary Artery Disease (CAD), Cancer, Hyperlipidemia, 

Hypertension


Additional Past Medical History / Comment(s): pleurisy


History of Any Multi-Drug Resistant Organisms: None Reported


Past Surgical History: Heart Catheterization With Stent


Date of Last Stent Placement:: 2007


Past Psychological History: No Psychological Hx Reported


Smoking Status: Never smoker


Past Alcohol Use History: None Reported


Past Drug Use History: None Reported





Medications and Allergies


                                Home Medications











 Medication  Instructions  Recorded  Confirmed  Type


 


Imatinib Mesylate [Gleevec] 400 mg PO DAILY 09/01/14 04/14/22 History


 


Aspirin EC [Ecotrin Low Dose] 81 mg PO DAILY 04/14/22 04/14/22 History


 


Carvedilol [Coreg] 12.5 mg PO BID 04/14/22 04/14/22 History


 


Diclofenac Sodium Gel [Voltaren 2 gm TOPICAL QID 04/14/22 04/14/22 History





Gel]    


 


Ferrous Sulfate [Feosol] 325 mg PO DAILY 04/14/22 04/14/22 History


 


amLODIPine/ATORVASTATIN [Caduet 10 1 tab PO DAILY 04/14/22 04/14/22 History





mg-20 mg Tablet]    








                                    Allergies











Allergy/AdvReac Type Severity Reaction Status Date / Time


 


No Known Allergies Allergy   Verified 04/14/22 10:33














Physical Exam


Vitals: 


                                   Vital Signs











  Temp Pulse Resp BP Pulse Ox


 


 04/19/22 08:00  97.2 F L  78  18  145/79  97


 


 04/19/22 03:51  98.2 F  85  18  147/77  93 L


 


 04/19/22 02:00   91   


 


 04/19/22 00:00   91  18  158/69  92 L


 


 04/18/22 20:30  98.5 F  86  20  161/69  94 L


 


 04/18/22 16:00  98.2 F  83  18  138/63  91 L








                                Intake and Output











 04/18/22 04/19/22 04/19/22





 22:59 06:59 14:59


 


Intake Total 1067  440


 


Output Total 350 500 


 


Balance 717 -500 440


 


Intake:   


 


  IV 10  


 


    Invasive Line 2 10  


 


  Oral 1057  440


 


Output:   


 


  Urine 350 500 


 


Other:   


 


  Voiding Method Urinal Urinal 


 


  Weight 108.5 kg 109.1 kg 














Pt is awake, comfortable, not in acute distress


Alert and oriented x3


Heart sounds are heard, no rub


Examination of the lungs shows bilateral breath sounds, no significant crackles 

heard


Abdomen is soft, non tender


Examination of the lower extremities shows 1+ edema bilat


CNS exam is intact





Results





- Lab Results


                             Most recent lab results











Calcium  8.6 mg/dL (8.4-10.2)   04/19/22  08:30    


 


Magnesium  2.2 mg/dL (1.6-2.3)   04/18/22  06:32    














                                 04/19/22 08:30





                                 04/19/22 08:30





Assessment and Plan


Assessment: 





1. AMINTA, ATN vs cardiorenal. Pt has been diuresed. Check U/A. No retention noted.


Decrease lasix


2. CKD stage 3a secondary to nephrosclerosis. Baseline cr 1.3mg/dL


3. Volume overload, improving.


4. Right lung pneumonia, maintained on antibiotics.


5. Mild hyperkalemia associated with AMINTA, expect improvement with loop diuretics


Plan: 





Check U/A


Check USS kidneys


Decrease lasix to daily


Encouraged increased oral intake.


Continue to monitor UOP.


Treat constipation.


Check orthostatic BP

## 2022-04-19 NOTE — P.PN
Subjective





Patient was examined at bedside today not complaining of any new symptomatology.

 I did have an extensive conversation with his son yesterday Boogie that is 

agreeable with her medical care.  Today one of his other sons was also present 

in all questions were answered.  Is currently saturating above 95% on 2 L nasal 

cannula for comfort care.  He did sleep well last night and states that his 

shortness of breath is improved compared to yesterday.  He denies any phlegm 

production, fever or chills.  Case discussed in detail with RN.





Objective





- Vital Signs


Vital signs: 


                                   Vital Signs











Temp  98.2 F   04/19/22 03:51


 


Pulse  85   04/19/22 03:51


 


Resp  18   04/19/22 03:51


 


BP  147/77   04/19/22 03:51


 


Pulse Ox  93 L  04/19/22 03:51








                                 Intake & Output











 04/18/22 04/19/22 04/19/22





 18:59 06:59 18:59


 


Intake Total 775.554 820 440


 


Output Total 325 750 


 


Balance 450.554 70 440


 


Weight 108.5 kg 109.1 kg 


 


Intake:   


 


  IV 30  


 


    Invasive Line 1 10  


 


    Invasive Line 2 20  


 


  Intake, IV Titration 18.554  





  Amount   


 


    Heparin Sod,Pork in 0.45% 18.554  





    NaCl 25,000 unit In 0.45   





    % NaCl 1 250ml.bag @ 18   





    UNITS/KG/HR 19.53 mls/hr   





    IV .[id number] [facility] Rx#:   





    [id number]   


 


  Oral 727 820 440


 


Output:   


 


  Gastric Drainage 0  


 


  Urine 325 750 


 


  Urine/Stool Mix 0  


 


  Emesis 0  


 


  Oral Regurgitation 0  


 


Other:   


 


  Voiding Method Urinal Urinal 


 


  # Bowel Movements 0  














- Exam





GENERAL: AAO X3 feeling better compared to yesterday


HEENT: Head is normocephalic. Pupils are equal, round. Sclerae anicteric. Mucous

membranes of the mouth are moist.  No JVD. 


CHEST EXAMINATION: Lungs are diminished in the bases, very slight crackles 

appreciated no wheezing.  No chest wall tenderness is noted on palpation or with

deep breathing. 


HEART EXAMINATION: Regular rate and rhythm. S1, S2 heard. Systolic ejection 

murmur heard at base and apex 


ABDOMEN: Soft, nontender. Positive bowel sounds.


EXTREMITIES: +2 pitting danielle b/l


NEUROLOGIC EXAMINATION: Patient is awake, alert and oriented x3.





- Labs


CBC & Chem 7: 


                                 04/19/22 08:30





                                 04/19/22 08:30


Labs: 


                  Abnormal Lab Results - Last 24 Hours (Table)











  04/19/22 04/19/22 Range/Units





  08:30 08:30 


 


WBC  20.3 H   (3.8-10.6)  k/uL


 


RBC  3.51 L   (4.30-5.90)  m/uL


 


Hgb  11.0 L   (13.0-17.5)  gm/dL


 


Hct  34.8 L   (39.0-53.0)  %


 


Neutrophils #  17.4 H   (1.3-7.7)  k/uL


 


Monocytes #  1.2 H   (0-1.0)  k/uL


 


Sodium   133 L  (137-145)  mmol/L


 


Potassium   5.2 H  (3.5-5.1)  mmol/L


 


Chloride   96 L  ()  mmol/L


 


Carbon Dioxide   33 H  (22-30)  mmol/L


 


BUN   37 H  (9-20)  mg/dL


 


Creatinine   1.88 H  (0.66-1.25)  mg/dL


 


Glucose   118 H  (74-99)  mg/dL








                      Microbiology - Last 24 Hours (Table)











 04/14/22 11:00 Blood Culture - Preliminary





 Blood    No Growth after 96 hours


 


 04/14/22 10:45 Blood Culture - Preliminary





 Blood    No Growth after 96 hours














Assessment and Plan


Assessment: 








Assessment/plan





Congestive heart failure with preserved ejection fraction - NYHA class III stage

C


-Patient has significant weight gain of approximately 37 pounds.  Baseline 

weight is 212 pounds as per patient.  


-Strict input/output, low sodium diet, 30 stricture less than 1.4 L. daily 

weight


-Patient further recommendations from cardiology


-Elevated proBNP trend for only prognostic factors 





Acute kidney injury most likely cardiorenal syndrome


-Creatinine currently trending up 1.88.


-Appreciate nephrology's recommendations.


-Continue with diuresis IV Lasix 20 mg twice a day anticipate creatinine to 

slowly trend down.


-Repeat BMP in the morning





Community acquired pneumonia


-Antibiotics discontinued by pulmonary group.


-Cultures negative to date





Acute hypoxic respiratory distress secondary to above multifactorial congestive 

heart failure/CAPD 


-Maintain saturations above 90%, encourage incentive spirometry 


-Compression stockings in the lower extremity, elevate legs at night 


-Repeat chest x-ray 


-Well score low, DVT negative the lower extremity, VQ scan reviewed also 

independently reviewed with pulmonary.  At this time low threshold for PE as a 

patient not tachycardic, tachypneic.  We'll discontinue heparin drip and 

continue to monitor 





Coronary artery disease


-Continue home medications aspirin and atorvastatin


-Continue home medication carvedilol


-Coronary artery disease s/p PCI x 2 to the mid LAD in 2006





Hypertension


-Blood pressure goal of less than 130/80





 Obesity


-Lifestyle modification and dietary changes as able





History of Leukemia


follows with Dr. Génesis chen





DVT prophylaxis heparin 3 times a day

## 2022-04-19 NOTE — P.PN
Subjective


Progress Note Date: 04/19/22


Principal diagnosis: 





Shortness of breath.





This is an 82-year-old male patient who has a history of chronic lymphocytic 

leukemia, hypertension, hyperlipidemia, coronary artery disease with previous 

stent placement.  Nonsmoker.  He had presented to the emergency room back on 

04/14/2022 with complaints of increasing shortness of breath over the past month

worsening the past 2 weeks with significant dyspnea on exertion and swelling of 

the lower extremities.  X-ray revealed a right lower lobe infiltrate with small 

pleural effusion.  EKG reveals sinus rhythm with no significant ST or T wave 

abnormalities.  Echocardiogram revealed preserved left ventricular systolic 

function with ejection fraction 66 5%.  Mild pulmonary hypertension.  No 

significant valvular abnormalities.  Dopplers of the lower extremity were 

negative for DVT.  The cultures revealing no growth to date.  White count 17.8. 

Hemoglobin 11.0.  Sodium 135.  Potassium 4.8.  Bicarb 31.  BUN 33.  Creatinine 

1.27.  Glucose 121.  ProBNP 2670.  Influenza screen negative.  RSV screen 

negative.  COVID-19 screen negative.  The patient was treated with diuretics.  

Follow-up chest x-ray revealed improved bilateral lower lobe infiltrate and 

small effusion.  He is seen today in consultation on the selective care unit.  

Awake and alert in no acute distress.  He is sitting up in a chair at the 

bedside.  Maintaining O2 saturations in the 90s on 2 L/m per nasal cannula.  

Previously documented 96% on room air.  He's afebrile.  Hemodynamically stable.





Patient was reevaluated today on 4/17/2022, patient is feeling a bit better, 

nonetheless continues to have shortness of breath with any activity.  Follow-up 

chest x-ray since admission showed improvement in his bilateral interstitial 

edema and small effusion, pro-calcitonin level was not impressive and not 

consistent with pneumonia.  Patient has been receiving diuretics by cardiology, 

his renal functioning worsened with diuretics, hence that diuretics dose has 

been cut down by cardiology.  Still concerned about his shortness of breath was 

relatively unremarkable chest x-ray/follow-up chest x-ray, hence I'm 

recommending a VQ scan on this patient.  He did have a venous Doppler on 

admission and it was negative.  WBC count today is 17.4 hemoglobin is 11.2 el

ectrolytes are normal however renal profile is worse with BUN of 35 creatinine 

1.65.  Pro-calcitonin level is 0.1.








Progress note dated 04/18/2022.





Currently, the patient sitting at the bedside.  He's on room air.  Is not 

receiving any IV fluids.  He was started on a heparin drip, for possible pul

monary embolism.  He had a perfusion lung scan which is interpreted to be 

indeterminate for pulmonary embolism.  He did not have the ventilation portion 

of the scan.  His pro-calcitonin level is 0.10.  Dopplers of lower extremities 

were negative.  The patient apparently is considering transfer to an outside 

hospital.  This was according to the nurses spoke to the patient's son, who 

apparently is an ICU nurse.  Clinically, the patient looks stable.  White count 

19.3, hemoglobin 11.6, hematocrit 35.7, and platelet count normal.  D-dimer was 

4.14.  Sodium 134, potassium 5.1, chlorides 98, CO2 34, BUN 35, creatinine 1.74.

 Albumin was 2.8.  The perfusion lung scan was positive for a small wedge-shaped

defect in the right lower lung zone.  Again there was no ventilation portion to 

the scan.





Progress note dated 04/19/2022.





82-year-old male, seen today in room 354.  The patient's currently doing better 

today.  He feels better.  His room air saturation is 94%.  He's not receiving 

any IV fluids.  Dopplers of the lower extremities were negative.  He did have a 

perfusion lung scan which was determined to be indeterminate.  We did not think 

he needed the heparin.  The primary service also agreed with that assessment.  

Currently, his vital signs are stable.  Current laboratory data includes a white

count of 20.3, hemoglobin 11, hematocrit 34.8, and platelet count 261,000.  

Sodium 133, potassium I 0.2, chlorides 96, CO2 33, BUN 37, and creatinine 1.88. 

His initial creatinine was 1.38.  Chest x-ray shows bilateral infiltrates and 

small effusions, with my opinion appear improved.





Objective





- Vital Signs


Vital signs: 


                                   Vital Signs











Temp  98.2 F   04/19/22 03:51


 


Pulse  85   04/19/22 03:51


 


Resp  18   04/19/22 03:51


 


BP  147/77   04/19/22 03:51


 


Pulse Ox  93 L  04/19/22 03:51








                                 Intake & Output











 04/18/22 04/19/22 04/19/22





 18:59 06:59 18:59


 


Intake Total 775.554 820 440


 


Output Total 325 750 


 


Balance 450.554 70 440


 


Weight 108.5 kg 109.1 kg 


 


Intake:   


 


  IV 30  


 


    Invasive Line 1 10  


 


    Invasive Line 2 20  


 


  Intake, IV Titration 18.554  





  Amount   


 


    Heparin Sod,Pork in 0.45% 18.554  





    NaCl 25,000 unit In 0.45   





    % NaCl 1 250ml.bag @ 18   





    UNITS/KG/HR 19.53 mls/hr   





    IV .[id number] [facility] Rx#:   





    [id number]   


 


  Oral 727 820 440


 


Output:   


 


  Gastric Drainage 0  


 


  Urine 325 750 


 


  Urine/Stool Mix 0  


 


  Emesis 0  


 


  Oral Regurgitation 0  


 


Other:   


 


  Voiding Method Urinal Urinal 


 


  # Bowel Movements 0  














- Exam





No acute distress, oriented 3.  No respiratory distress, conversational d

yspnea, or audible wheezing.





HEENT examination is grossly unremarkable.  





Neck supple.  Full range of motion.  No adenopathy thyromegaly or neck vein 

distention.





Cardiovascular examination reveals regular rhythm rate.  S1-S2 normal.  No S3 or

S4.  Very soft systolic murmur is noted.  Heart rate is 85 bpm.





Lungs essentially clear breath sounds.  Minimal scattered rhonchi.  No wheezes 

or crackles.  Room air saturations between 93 and 94%.  Breath sounds are equal 

bilaterally.





Abdomen soft bowel sounds are heard.  No masses or tenderness.





Extremities are intact.  No cyanosis clubbing or edema.





Skin is without rash or lesion.





Neurologic examination is brief but nonfocal.





- Labs


CBC & Chem 7: 


                                 04/19/22 08:30





                                 04/19/22 08:30


Labs: 


                  Abnormal Lab Results - Last 24 Hours (Table)











  04/19/22 04/19/22 Range/Units





  08:30 08:30 


 


WBC  20.3 H   (3.8-10.6)  k/uL


 


RBC  3.51 L   (4.30-5.90)  m/uL


 


Hgb  11.0 L   (13.0-17.5)  gm/dL


 


Hct  34.8 L   (39.0-53.0)  %


 


Neutrophils #  17.4 H   (1.3-7.7)  k/uL


 


Monocytes #  1.2 H   (0-1.0)  k/uL


 


Sodium   133 L  (137-145)  mmol/L


 


Potassium   5.2 H  (3.5-5.1)  mmol/L


 


Chloride   96 L  ()  mmol/L


 


Carbon Dioxide   33 H  (22-30)  mmol/L


 


BUN   37 H  (9-20)  mg/dL


 


Creatinine   1.88 H  (0.66-1.25)  mg/dL


 


Glucose   118 H  (74-99)  mg/dL








                      Microbiology - Last 24 Hours (Table)











 04/14/22 11:00 Blood Culture - Preliminary





 Blood    No Growth after 96 hours


 


 04/14/22 10:45 Blood Culture - Preliminary





 Blood    No Growth after 96 hours














Assessment and Plan


Assessment: 





Shortness of breath, secondary to acute exacerbation of diastolic CHF.





Doubt pneumonia in this patient.





CAD with previous stent placement.





History of hypertension.





History of hyperlipidemia.





Chronic lymphocytic leukemia.





Perfusion lung scan, showing a wedge-shaped defect in the right lower lobe, 

without a corresponding ventilation scan.


Plan: 





Plan dated 04/18/2022.





The patient's perfusion lung scan was read as indeterminate/intermediate 

probability for PE.  The scan showed a small wedge-shaped perfusion defect in 

the right lower lobe.  There is no corresponding ventilation scan.  The patient 

appears not to be any distress.  He seems to be much improved just with d

iuretics.  I do not feel strongly that he needs IV heparin.  Dopplers of the 

lower extremities were negative.  Follow make recommendations where appropriate.

 Prognosis is guarded.  Apparently the patient is going to be transferred to an 

outside facility according to the patient's son.





Plan dated 04/19/2022.





The patient appears to be a bit better today.  He is currently on room air.  

Saturations are 93-94%.  Dopplers of the lower extremities were negative.  

Perfusion lung scan was indeterminate for pulmonary embolism.  Labs, x-rays, and

medications are reviewed.  His creatinine is rising.  The patient is getting 

gentle diuresis.  We will continue to follow patient and make recommendations 

where appropriate.  I did speak to the family member at the bedside.  Prognosis 

is guarded.


Time with Patient: Less than 30

## 2022-04-19 NOTE — US
EXAMINATION TYPE: US kidneys/renal and bladder

 

DATE OF EXAM: 4/19/2022

 

COMPARISON: NONE

 

CLINICAL HISTORY: renal failure. Abnormal labs.  

 

EXAM MEASUREMENTS:

 

Right Kidney:  13.2 x 5.8 x 6.2 cm

Left Kidney: 10.4 x 5.4 x 5.7 cm

 

***Very limited exam due to patient body habitus and bowel gas

 

Right Kidney: Limited visualization.  Possible lower pole mass = 5.6 x 5.6 x 5.1 cm.  Mid anterior co
rtical septated cyst = 1.2 x 1.3 x 1.2 cm.     

Left Kidney: Limited exam.  Lateral cystic lesions, 1= 2.4 x 2.6 x 2.9 cm and 2= 2.6 x 2.3 x 2.2 cm. 
   

Bladder: Anechoic, distended

**Bilateral Jets not seen

 

 

IMPRESSION:

1. Limited exam demonstrates findings suspicious for a 5.6 cm lower pole right renal mass. Recommend 
CT scan.

2. No hydronephrosis or nephrolithiasis.

## 2022-04-19 NOTE — XR
EXAMINATION TYPE: XR chest 1V portable

 

DATE OF EXAM: 4/19/2022

 

COMPARISON: 4/15/2022

 

HISTORY: Shortness of breath

 

TECHNIQUE: Single frontal view of the chest is obtained.

 

FINDINGS:  There is a bilateral infiltrate and pleural effusion. Heart is enlarged. Atherosclerotic c
hange aorta. Diffuse osteopenia. No pneumothorax.

 

IMPRESSION:  Bilateral infiltrate and pleural effusion stable correlate for pneumonia otherwise consi
krystian CHF

## 2022-04-20 LAB
ANION GAP SERPL CALC-SCNC: 4 MMOL/L
BASOPHILS # BLD AUTO: 0.1 K/UL (ref 0–0.2)
BASOPHILS NFR BLD AUTO: 0 %
BUN SERPL-SCNC: 44 MG/DL (ref 9–20)
CALCIUM SPEC-MCNC: 8.7 MG/DL (ref 8.4–10.2)
CHLORIDE SERPL-SCNC: 96 MMOL/L (ref 98–107)
CO2 SERPL-SCNC: 33 MMOL/L (ref 22–30)
EOSINOPHIL # BLD AUTO: 0.6 K/UL (ref 0–0.7)
EOSINOPHIL NFR BLD AUTO: 3 %
ERYTHROCYTE [DISTWIDTH] IN BLOOD BY AUTOMATED COUNT: 3.4 M/UL (ref 4.3–5.9)
ERYTHROCYTE [DISTWIDTH] IN BLOOD: 13.8 % (ref 11.5–15.5)
GLUCOSE SERPL-MCNC: 110 MG/DL (ref 74–99)
HCT VFR BLD AUTO: 33.2 % (ref 39–53)
HGB BLD-MCNC: 11.1 GM/DL (ref 13–17.5)
LYMPHOCYTES # SPEC AUTO: 0.9 K/UL (ref 1–4.8)
LYMPHOCYTES NFR SPEC AUTO: 5 %
MCH RBC QN AUTO: 32.5 PG (ref 25–35)
MCHC RBC AUTO-ENTMCNC: 33.3 G/DL (ref 31–37)
MCV RBC AUTO: 97.8 FL (ref 80–100)
MONOCYTES # BLD AUTO: 0.8 K/UL (ref 0–1)
MONOCYTES NFR BLD AUTO: 4 %
NEUTROPHILS # BLD AUTO: 15.7 K/UL (ref 1.3–7.7)
NEUTROPHILS NFR BLD AUTO: 86 %
PLATELET # BLD AUTO: 294 K/UL (ref 150–450)
POTASSIUM SERPL-SCNC: 5.4 MMOL/L (ref 3.5–5.1)
SODIUM SERPL-SCNC: 133 MMOL/L (ref 137–145)
WBC # BLD AUTO: 18.3 K/UL (ref 3.8–10.6)

## 2022-04-20 RX ADMIN — HEPARIN SODIUM SCH: 5000 INJECTION INTRAVENOUS; SUBCUTANEOUS at 10:51

## 2022-04-20 RX ADMIN — ASPIRIN SCH MG: 325 TABLET ORAL at 08:53

## 2022-04-20 RX ADMIN — ASPIRIN 81 MG CHEWABLE TABLET SCH MG: 81 TABLET CHEWABLE at 08:50

## 2022-04-20 RX ADMIN — FUROSEMIDE SCH MG: 10 INJECTION, SOLUTION INTRAMUSCULAR; INTRAVENOUS at 08:50

## 2022-04-20 RX ADMIN — HEPARIN SODIUM SCH UNIT: 5000 INJECTION INTRAVENOUS; SUBCUTANEOUS at 23:10

## 2022-04-20 RX ADMIN — ONDANSETRON PRN MG: 2 INJECTION INTRAMUSCULAR; INTRAVENOUS at 08:50

## 2022-04-20 RX ADMIN — Medication SCH MG: at 08:50

## 2022-04-20 RX ADMIN — HEPARIN SODIUM SCH UNIT: 5000 INJECTION INTRAVENOUS; SUBCUTANEOUS at 16:46

## 2022-04-20 RX ADMIN — ATORVASTATIN CALCIUM SCH MG: 40 TABLET, FILM COATED ORAL at 08:50

## 2022-04-20 RX ADMIN — SPIRONOLACTONE SCH MG: 25 TABLET, FILM COATED ORAL at 08:50

## 2022-04-20 NOTE — P.PN
Subjective





Patient is seen for follow-up for acute kidney injury.


Patient is currently being diuresed for volume overload.


Serum creatinine is stable at 1.8 mg/dL for the last 2 days.


Lasix was decreased to once a day yesterday.





24 hour urine output at 1075 mL





Discussed with patient and family regarding ultrasound findings of 5.6 cm right 

lower pole renal mass.





Objective





- Vital Signs


Vital signs: 


                                   Vital Signs











Temp  98.0 F   04/20/22 12:16


 


Pulse  67   04/20/22 12:16


 


Resp  18   04/20/22 12:16


 


BP  144/78   04/20/22 12:16


 


Pulse Ox  95   04/20/22 12:16








                                 Intake & Output











 04/19/22 04/20/22 04/20/22





 18:59 06:59 18:59


 


Intake Total 1334 826 


 


Output Total 385 600 


 


Balance 949 226 


 


Weight  109.9 kg 109.9 kg


 


Intake:   


 


  Oral 1334 826 


 


Output:   


 


  Urine 385 600 


 


Other:   


 


  Voiding Method Urinal  Urinal














- Exam





Patient is awake comfortable, not in any acute distress


Examination of the heart S1 and S2


Exertion lungs bilateral breath sounds are heard


Abdomen is soft nontender


Exertion lower extremities shows bilateral extremities to be wrapped.  1+ edema 

noted yesterday


CNS exam intact





- Labs


CBC & Chem 7: 


                                 04/20/22 05:48





                                 04/20/22 05:48


Labs: 


                  Abnormal Lab Results - Last 24 Hours (Table)











  04/20/22 04/20/22 Range/Units





  05:48 05:48 


 


WBC  18.3 H   (3.8-10.6)  k/uL


 


RBC  3.40 L   (4.30-5.90)  m/uL


 


Hgb  11.1 L   (13.0-17.5)  gm/dL


 


Hct  33.2 L   (39.0-53.0)  %


 


Neutrophils #  15.7 H   (1.3-7.7)  k/uL


 


Lymphocytes #  0.9 L   (1.0-4.8)  k/uL


 


Sodium   133 L  (137-145)  mmol/L


 


Potassium   5.4 H  (3.5-5.1)  mmol/L


 


Chloride   96 L  ()  mmol/L


 


Carbon Dioxide   33 H  (22-30)  mmol/L


 


BUN   44 H  (9-20)  mg/dL


 


Creatinine   1.84 H  (0.66-1.25)  mg/dL


 


Glucose   110 H  (74-99)  mg/dL








                      Microbiology - Last 24 Hours (Table)











 04/14/22 11:00 Blood Culture - Final





 Blood    No Growth after 144 hours


 


 04/14/22 10:45 Blood Culture - Final





 Blood    No Growth after 144 hours














Assessment and Plan


Assessment: 





1. AMINTA, ATN vs cardiorenal. Pt has been diuresed.  UA shows 1+ protein no 

significant cells, no blood.. No retention noted.  Lasix was decreased 

yesterday.





2. CKD stage 3a secondary to nephrosclerosis. Baseline cr 1.3mg/dL


3. Volume overload, improving.


4. Right lung pneumonia, maintained on antibiotics.


5. Mild hyperkalemia associated with AMINTA, expect improvement with loop diuretics


6.  5.6 cm right renal mass.  This needs further evaluation.  Ideally patient 

will benefit from a CT with IV contrast however given his acute kidney injury 

the CAT scan will be done without IV contrast.  Patient and family are also 

advised that he will need evaluation with urology.  This can be done as 

outpatient as well.


Plan: 





Change Lasix to by mouth


Check CT abdomen and pelvis without IV contrast


Repeat labs in a.m.


Lokelma 10 g by mouth 1


Continue with Aldactone for now

## 2022-04-20 NOTE — P.PN
Subjective


Progress Note Date: 04/20/22





This is an 82-year-old male patient who has a history of chronic lymphocytic 

leukemia, hypertension, hyperlipidemia, coronary artery disease with previous 

stent placement.  Nonsmoker.  He had presented to the emergency room back on 

04/14/2022 with complaints of increasing shortness of breath over the past month

worsening the past 2 weeks with significant dyspnea on exertion and swelling of 

the lower extremities.  X-ray revealed a right lower lobe infiltrate with small 

pleural effusion.  EKG reveals sinus rhythm with no significant ST or T wave 

abnormalities.  Echocardiogram revealed preserved left ventricular systolic 

function with ejection fraction 66 5%.  Mild pulmonary hypertension.  No signifi

cant valvular abnormalities.  Dopplers of the lower extremity were negative for 

DVT.  The cultures revealing no growth to date.  White count 17.8.  Hemoglobin 

11.0.  Sodium 135.  Potassium 4.8.  Bicarb 31.  BUN 33.  Creatinine 1.27.  

Glucose 121.  ProBNP 2670.  Influenza screen negative.  RSV screen negative.  

COVID-19 screen negative.  The patient was treated with diuretics.  Follow-up 

chest x-ray revealed improved bilateral lower lobe infiltrate and small 

effusion.  He is seen today in consultation on the selective care unit.  Awake 

and alert in no acute distress.  He is sitting up in a chair at the bedside.  

Maintaining O2 saturations in the 90s on 2 L/m per nasal cannula.  Previously 

documented 96% on room air.  He's afebrile.  Hemodynamically stable.





Patient was reevaluated today on 4/17/2022, patient is feeling a bit better, 

nonetheless continues to have shortness of breath with any activity.  Follow-up 

chest x-ray since admission showed improvement in his bilateral interstitial 

edema and small effusion, pro-calcitonin level was not impressive and not 

consistent with pneumonia.  Patient has been receiving diuretics by cardiology, 

his renal functioning worsened with diuretics, hence that diuretics dose has 

been cut down by cardiology.  Still concerned about his shortness of breath was 

relatively unremarkable chest x-ray/follow-up chest x-ray, hence I'm 

recommending a VQ scan on this patient.  He did have a venous Doppler on 

admission and it was negative.  WBC count today is 17.4 hemoglobin is 11.2 

electrolytes are normal however renal profile is worse with BUN of 35 creatinine

1.65.  Pro-calcitonin level is 0.1.








Progress note dated 04/18/2022.





Currently, the patient sitting at the bedside.  He's on room air.  Is not 

receiving any IV fluids.  He was started on a heparin drip, for possible 

pulmonary embolism.  He had a perfusion lung scan which is interpreted to be 

indeterminate for pulmonary embolism.  He did not have the ventilation portion 

of the scan.  His pro-calcitonin level is 0.10.  Dopplers of lower extremities 

were negative.  The patient apparently is considering transfer to an outside 

hospital.  This was according to the nurses spoke to the patient's son, who 

apparently is an ICU nurse.  Clinically, the patient looks stable.  White count 

19.3, hemoglobin 11.6, hematocrit 35.7, and platelet count normal.  D-dimer was 

4.14.  Sodium 134, potassium 5.1, chlorides 98, CO2 34, BUN 35, creatinine 1.74.

 Albumin was 2.8.  The perfusion lung scan was positive for a small wedge-shaped

defect in the right lower lung zone.  Again there was no ventilation portion to 

the scan.





Progress note dated 04/19/2022.





82-year-old male, seen today in room 354.  The patient's currently doing better 

today.  He feels better.  His room air saturation is 94%.  He's not receiving 

any IV fluids.  Dopplers of the lower extremities were negative.  He did have a 

perfusion lung scan which was determined to be indeterminate.  We did not think 

he needed the heparin.  The primary service also agreed with that assessment.  

Currently, his vital signs are stable.  Current laboratory data includes a white

count of 20.3, hemoglobin 11, hematocrit 34.8, and platelet count 261,000.  

Sodium 133, potassium I 0.2, chlorides 96, CO2 33, BUN 37, and creatinine 1.88. 

His initial creatinine was 1.38.  Chest x-ray shows bilateral infiltrates and 

small effusions, with my opinion appear improved.








The patient is seen today 04/20/2022 in follow-up on the selective care unit.  

He is currently sitting up in a chair at the bedside.  Awake and alert in no 

acute distress.  He is maintaining good O2 saturations in the mid 90s on room 

air.  He's been afebrile.  Hemodynamically stable.  Chest x-ray reveals 

bilateral infiltrate with pleural effusions which are stable compared to 

previous.  Ultrasound of the kidneys reveal a suspicious 5.6 cm lower pole right

renal mass.  No hydronephrosis or nephrolithiasis.  Blood cultures revealed no 

growth.  White count 18.3.  Hemoglobin 11.1.  Platelets 294.  Sodium 133.  

Potassium 5.4.  BUN 44.  Creatinine 1.84.  Glucose 110.  He remains on IV 

diuretics, Aldactone, bronchodilators.  Currently in a +500 ML balance





Objective





- Vital Signs


Vital signs: 


                                   Vital Signs











Temp  97.6 F   04/20/22 08:38


 


Pulse  67   04/20/22 08:38


 


Resp  18   04/20/22 08:38


 


BP  135/67   04/20/22 08:38


 


Pulse Ox  96   04/20/22 08:38








                                 Intake & Output











 04/19/22 04/20/22 04/20/22





 18:59 06:59 18:59


 


Intake Total 1334 826 


 


Output Total 385 600 


 


Balance 949 226 


 


Weight  109.9 kg 


 


Intake:   


 


  Oral 1334 826 


 


Output:   


 


  Urine 385 600 


 


Other:   


 


  Voiding Method Urinal  














- Exam





GENERAL EXAM: Alert, obese 82-year-old male patient, on room air, comfortable in

no apparent distress.


HEAD: Normocephalic.


EYES: Normal reaction of pupils, equal size.


NOSE: Clear with pink turbinates.


THROAT: No erythema or exudates.


NECK: No masses, no JVD.


CHEST: No chest wall deformity.


LUNGS: Equal air entry with crackles in the bilateral bases.


CVS: S1 and S2 normal with no audible murmur, regular rhythm.


ABDOMEN: No hepatosplenomegaly, normal bowel sounds, no guarding or rigidity.


SPINE: No scoliosis or deformity


SKIN: No rashes


CENTRAL NERVOUS SYSTEM: No focal deficits, tone is normal in all 4 extremities.


EXTREMITIES: There is 1+ peripheral edema.  No clubbing, no cyanosis.  

Peripheral pulses are intact.





- Labs


CBC & Chem 7: 


                                 04/20/22 05:48





                                 04/20/22 05:48


Labs: 


                  Abnormal Lab Results - Last 24 Hours (Table)











  04/19/22 04/20/22 04/20/22 Range/Units





  13:04 05:48 05:48 


 


WBC   18.3 H   (3.8-10.6)  k/uL


 


RBC   3.40 L   (4.30-5.90)  m/uL


 


Hgb   11.1 L   (13.0-17.5)  gm/dL


 


Hct   33.2 L   (39.0-53.0)  %


 


Neutrophils #   15.7 H   (1.3-7.7)  k/uL


 


Lymphocytes #   0.9 L   (1.0-4.8)  k/uL


 


Sodium    133 L  (137-145)  mmol/L


 


Potassium    5.4 H  (3.5-5.1)  mmol/L


 


Chloride    96 L  ()  mmol/L


 


Carbon Dioxide    33 H  (22-30)  mmol/L


 


BUN    44 H  (9-20)  mg/dL


 


Creatinine    1.84 H  (0.66-1.25)  mg/dL


 


Glucose    110 H  (74-99)  mg/dL


 


Urine Protein  1+ H    (Negative)  


 


Urine Bacteria  Rare H    (None)  /hpf


 


Hyaline Casts  17 H    (0-2)  /lpf


 


Urine Mucus  Occasional H    (None)  /hpf








                      Microbiology - Last 24 Hours (Table)











 04/14/22 11:00 Blood Culture - Preliminary





 Blood    No Growth after 120 hours


 


 04/14/22 10:45 Blood Culture - Preliminary





 Blood    No Growth after 120 hours














Assessment and Plan


Assessment: 





1 Acute exacerbation of diastolic congestive heart failure.  Chest x-ray shows 

improvement post diuretics.





2 Dyspnea on exertion secondary to above, doubt community-acquired pneumonia of 

the right lung base. Procalcitonin 0.10





3 Coronary artery disease with previous stent placement





4 Hypertension





5 Hyperlipidemia





6 Chronic lymphocytic leukemia





7 Right renal mass measuring 5.6 cm.





Plan:





The patient was seen and evaluated


X-rays, labs and medications reviewed


Pro-calcitonin 0.10, antibiotics were discontinued


Continue diuretics


Stable and on room air


Renal mass will need further investigation


We will continue to follow 





I have personally seen and examined the patient, performed the documentation and

the assessment and plan as written.  Number of minutes spent on the visit: 10.

## 2022-04-20 NOTE — CT
EXAMINATION TYPE: CT abdomen pelvis wo con

 

DATE OF EXAM: 4/20/2022

 

COMPARISON: Ultrasound dated 4/19/2022

 

HISTORY: Right renal mass

 

CT DLP: 1644.4 mGycm

Automated exposure control for dose reduction was used.

 

TECHNIQUE:  Helical acquisition of images was performed from the lung bases through the pelvis.

 

FINDINGS: 

Suboptimal CT scan with artifactual images.

 

LUNG BASES: Bilateral pleural effusions and subsegmental pulmonary atelectasis. Coronary arterial miky
cifications.

 

LIVER/GB: Previous cholecystectomy. No definite hepatic focal lesion.

 

PANCREAS: Suboptimally assessed, grossly unremarkable.

 

SPLEEN: Small spleen.

 

ADRENALS: No significant abnormality is seen.

 

KIDNEYS: Highly suspicious slightly hyperdense heterogeneous lesion is seen arising from the inferior
 aspect of the right kidney measuring 7.5 x 7.5 x 8.5 cm with tiny calcification within. Irregular ex
tension is seen along the anterolateral aspect of the lesion measuring up to 6.1 cm which could repre
sent an extension of the lesion versus another heterogeneous lesion. The described renal lesion is se
en extending superiorly into the right renal hilum. It is not possible to assess the right renal vess
els by this nonenhanced CT scan. Left renal cysts are seen with a marginally calcified cyst at the up
per pole measuring up to 5.1 cm, suboptimally assessed. 15 mm slightly hyperdense lesion is seen at t
he upper pole of the right kidney. Bilateral perinephric fat stranding and reactive fluid. No hydrone
phrosis bilaterally.

 

FREE AIR:  No free air is visualized

 

RETROPERITONEAL ADENOPATHY:  Suspicious retroperitoneal lymph nodes seen at the level of the describe
d renal mass measuring up to 13mm.

 

REPRODUCTIVE ORGANS: Enlarged prostate with prostatic concretion. Unremarkable seminal vesicles.

 

URINARY BLADDER:  No significant abnormality is seen.

 

PELVIC ADENOPATHY:  No pathologically enlarged pelvic lymph nodes.

 

OSSEOUS STRUCTURES:  Tiny sclerotic focus is seen within the left iliac bone, possibly representing a
 bone island. No gross aggressive bone lesion.

 

BOWEL:  Unremarkable stomach and duodenum. Grossly unremarkable small bowel. Fecal loading of the col
on. Colonic diverticulosis most evident involving the sigmoid colon and the descending colon. Borderl
ine appendix measuring 7.7 mm without significant surrounding acute inflammatory changes.

 

OTHER: Arterial atherosclerotic calcifications. Diffuse peritoneal reflection thickening, fat strandi
ng and minimal fluid. Bilateral fat-containing inguinal hernias, larger on the left side. Diffuse bod
y wall edema and reactive fluid. Tiny air bubbles are seen within the anterior abdominal wall subcuta
neous tissue, please correlate with recent subcutaneous injections.

 

IMPRESSION: 

LARGE RIGHT LOWER RENAL POLE MASS OR 2 ADJACENT LESIONS AS DESCRIBED ABOVE, SHOULD BE CONSIDERED AS R
ENAL CELL CARCINOMA UNTIL PROVEN OTHERWISE. RECOMMEND UROLOGY CONSULTATION. OTHER FINDINGS AS ABOVE.

## 2022-04-20 NOTE — P.PN
Subjective





Patient was examined at bedside today.  Son is also present all questions were 

answered.  He states that her shortness of breath is slightly improving.  I also

spoke with pulmonary that was at bedside recommend discontinuing IV antibiotics.

 Patient's on room air saturating above 90% not tachypneic or tachycardic.  

Ultrasound results were explained to the patient and son at bedside in detail.  

We will coordinate with nephrology for further recommendations regarding imaging

including CT.  They're agreeable with our medical treatment plan.  RN was 

present at bedside as well.





Objective





- Vital Signs


Vital signs: 


                                   Vital Signs











Temp  97.6 F   04/20/22 08:38


 


Pulse  67   04/20/22 08:38


 


Resp  18   04/20/22 08:38


 


BP  135/67   04/20/22 08:38


 


Pulse Ox  96   04/20/22 08:38








                                 Intake & Output











 04/19/22 04/20/22 04/20/22





 18:59 06:59 18:59


 


Intake Total 1334 826 


 


Output Total 385 600 


 


Balance 949 226 


 


Weight  109.9 kg 


 


Intake:   


 


  Oral 1334 826 


 


Output:   


 


  Urine 385 600 


 


Other:   


 


  Voiding Method Urinal  














- Exam





GENERAL: AAO X3 feeling better compared to yesterday


HEENT: Head is normocephalic. Pupils are equal, round. Sclerae anicteric. Mucous

membranes of the mouth are moist.  No JVD. 


CHEST EXAMINATION: Lungs are diminished in the bases, very slight crackles 

appreciated no wheezing.  No chest wall tenderness is noted on palpation or with

deep breathing. 


HEART EXAMINATION: Regular rate and rhythm. S1, S2 heard. Systolic ejection 

murmur heard at base and apex 


ABDOMEN: Soft, nontender. Positive bowel sounds.


EXTREMITIES: +2 pitting danielle b/l improving


NEUROLOGIC EXAMINATION: Patient is awake, alert and oriented x3.





- Labs


CBC & Chem 7: 


                                 04/20/22 05:48





                                 04/20/22 05:48


Labs: 


                  Abnormal Lab Results - Last 24 Hours (Table)











  04/19/22 04/20/22 04/20/22 Range/Units





  13:04 05:48 05:48 


 


WBC   18.3 H   (3.8-10.6)  k/uL


 


RBC   3.40 L   (4.30-5.90)  m/uL


 


Hgb   11.1 L   (13.0-17.5)  gm/dL


 


Hct   33.2 L   (39.0-53.0)  %


 


Neutrophils #   15.7 H   (1.3-7.7)  k/uL


 


Lymphocytes #   0.9 L   (1.0-4.8)  k/uL


 


Sodium    133 L  (137-145)  mmol/L


 


Potassium    5.4 H  (3.5-5.1)  mmol/L


 


Chloride    96 L  ()  mmol/L


 


Carbon Dioxide    33 H  (22-30)  mmol/L


 


BUN    44 H  (9-20)  mg/dL


 


Creatinine    1.84 H  (0.66-1.25)  mg/dL


 


Glucose    110 H  (74-99)  mg/dL


 


Urine Protein  1+ H    (Negative)  


 


Urine Bacteria  Rare H    (None)  /hpf


 


Hyaline Casts  17 H    (0-2)  /lpf


 


Urine Mucus  Occasional H    (None)  /hpf








                      Microbiology - Last 24 Hours (Table)











 04/14/22 11:00 Blood Culture - Preliminary





 Blood    No Growth after 120 hours


 


 04/14/22 10:45 Blood Culture - Preliminary





 Blood    No Growth after 120 hours














Assessment and Plan


Assessment: 








Assessment/plan





Congestive heart failure with preserved ejection fraction - NYHA class III stage

C


-Patient has significant weight gain of approximately 37 pounds.  Baseline 

weight is 212 pounds as per patient.  


-Strict input/output, low sodium diet, fluid less than 1.4 L. daily weight


-Patient further recommendations from cardiology


-Elevated proBNP trend for only prognostic factors 





Acute kidney injury most likely cardiorenal syndrome


-cr slowly trending down today currently 124.


-Renal ultrasound was completed which showed a 5.6 cm lower pole right renal 

mass.  Pending further recommendations from nephrology regarding computed 

tomography scan.


-Lasix decreased to 20 mg daily


-Repeat BMP in the morning





Community acquired pneumonia


-Antibiotics discontinued by pulmonary group.


-Cultures negative to date





Acute hypoxic respiratory distress secondary to above multifactorial congestive 

heart failure/CAPD 


-Maintain saturations above 90%, encourage incentive spirometry 


-Well score low, DVT negative the lower extremity, VQ scan reviewed also 

independently reviewed with pulmonary.  At this time low threshold for PE as a 

patient not tachycardic, tachypneic.  We'll discontinue heparin drip and 

continue to monitor 





Coronary artery disease


-Continue home medications aspirin and atorvastatin


-Continue home medication carvedilol


-Coronary artery disease s/p PCI x 2 to the mid LAD in 2006





Hypertension


-Blood pressure goal of less than 130/80





Obesity


-Lifestyle modification and dietary changes as able





History of Leukemia


follows with Dr. Capps group





DVT prophylaxis heparin 3 times a day

## 2022-04-21 LAB
ANION GAP SERPL CALC-SCNC: 5 MMOL/L
BUN SERPL-SCNC: 51 MG/DL (ref 9–20)
CALCIUM SPEC-MCNC: 8.8 MG/DL (ref 8.4–10.2)
CHLORIDE SERPL-SCNC: 91 MMOL/L (ref 98–107)
CO2 SERPL-SCNC: 34 MMOL/L (ref 22–30)
GLUCOSE SERPL-MCNC: 126 MG/DL (ref 74–99)
POTASSIUM SERPL-SCNC: 5.7 MMOL/L (ref 3.5–5.1)
SODIUM SERPL-SCNC: 130 MMOL/L (ref 137–145)

## 2022-04-21 RX ADMIN — HEPARIN SODIUM SCH UNIT: 5000 INJECTION INTRAVENOUS; SUBCUTANEOUS at 17:05

## 2022-04-21 RX ADMIN — ASPIRIN SCH: 325 TABLET ORAL at 09:14

## 2022-04-21 RX ADMIN — LACTULOSE SCH GM: 20 SOLUTION ORAL at 13:38

## 2022-04-21 RX ADMIN — DOCUSATE SODIUM SCH MG: 100 CAPSULE, LIQUID FILLED ORAL at 21:10

## 2022-04-21 RX ADMIN — ASPIRIN 81 MG CHEWABLE TABLET SCH MG: 81 TABLET CHEWABLE at 09:12

## 2022-04-21 RX ADMIN — LACTULOSE SCH GM: 20 SOLUTION ORAL at 21:10

## 2022-04-21 RX ADMIN — FUROSEMIDE SCH MG: 10 INJECTION, SOLUTION INTRAMUSCULAR; INTRAVENOUS at 09:12

## 2022-04-21 RX ADMIN — Medication SCH MG: at 09:13

## 2022-04-21 RX ADMIN — SPIRONOLACTONE SCH MG: 25 TABLET, FILM COATED ORAL at 09:12

## 2022-04-21 RX ADMIN — HEPARIN SODIUM SCH UNIT: 5000 INJECTION INTRAVENOUS; SUBCUTANEOUS at 23:31

## 2022-04-21 RX ADMIN — ATORVASTATIN CALCIUM SCH MG: 40 TABLET, FILM COATED ORAL at 09:13

## 2022-04-21 RX ADMIN — HEPARIN SODIUM SCH UNIT: 5000 INJECTION INTRAVENOUS; SUBCUTANEOUS at 09:12

## 2022-04-21 NOTE — P.PN
Subjective


Principal diagnosis: 


Chart was reviewed patient was seen and examined.  Patient is up in the chair 

this morning no respiratory complaints.  Patient only complaint is constipation 

however he passes gas.  No abdominal pain no nausea no vomiting.








Objective





- Vital Signs


Vital signs: 


                                   Vital Signs











Temp  97.6 F   04/21/22 04:36


 


Pulse  74   04/21/22 08:00


 


Resp  16   04/21/22 08:00


 


BP  133/66   04/21/22 08:00


 


Pulse Ox  94 L  04/21/22 08:00








                                 Intake & Output











 04/20/22 04/21/22 04/21/22





 18:59 06:59 18:59


 


Intake Total 367  


 


Output Total 500 175 


 


Balance -133 -175 


 


Weight 109.9 kg  


 


Intake:   


 


  Oral 367  


 


Output:   


 


  Urine 500 175 


 


Other:   


 


  Voiding Method Urinal Urinal Urinal














- Exam





GENERAL: AAO X3 feeling better compared to yesterday


HEENT: Head is normocephalic. Pupils are equal, round. Sclerae anicteric. Mucous

membranes of the mouth are moist.  No JVD. 


CHEST EXAMINATION: Lungs are diminished in the bases, very slight crackles 

appreciated no wheezing.  No chest wall tenderness is noted on palpation or with

deep breathing. 


HEART EXAMINATION: Regular rate and rhythm. S1, S2 heard. Systolic ejection 

murmur heard at base and apex 


ABDOMEN: Soft, nontender. Positive bowel sounds.


EXTREMITIES: +2 pitting danielle b/l improving


NEUROLOGIC EXAMINATION: Patient is awake, alert and oriented x3.





- Labs


CBC & Chem 7: 


                                 04/20/22 05:48





                                 04/20/22 05:48


Labs: 


                      Microbiology - Last 24 Hours (Table)











 04/14/22 11:00 Blood Culture - Final





 Blood    No Growth after 144 hours


 


 04/14/22 10:45 Blood Culture - Final





 Blood    No Growth after 144 hours














Assessment and Plan


Plan: 


#Acute on chronic diastolic CHF exacerbation


-Patient has significant weight gain of approximately 37 pounds.  Baseline 

weight is 212 pounds as per patient.  


-Strict input/output, low sodium diet, fluid less than 1.4 L. daily weight


Continue per cardiology recommendations


Currently he is on room air





#Acute kidney injury 


Nephrology following


Monitor creatinine, urine output, avoid nephrotoxins, monitor electrolytes





#Right renal mass


Likely malignant, urology evaluated recommended nephrectomy in near future


Patient will follow-up with urology on this


Patient does have oncologist in town and may follow up with him





#Community acquired pneumonia


-Antibiotics discontinued by pulmonary group.


-Cultures negative to date





#Acute hypoxic respiratory distress secondary to above multifactorial congestive

heart failure/CAPD 


Pulmonary service following


Currently on room air, might have a component of sleep apnea


-Maintain saturations above 90%, encourage incentive spirometry 


-Well score low, DVT negative the lower extremity, VQ scan reviewed also 

independently reviewed with pulmonary.  At this time low threshold for PE as a 

patient not tachycardic, tachypneic.  We'll discontinue heparin drip and 

continue to monitor 





#Coronary artery disease


-Continue home medications aspirin and atorvastatin


-Continue home medication carvedilol


-Coronary artery disease s/p PCI x 2 to the mid LAD in 2006





#Hypertension


-Blood pressure goal of less than 130/80





#Obesity


-Lifestyle modification and dietary changes as able





#History of Leukemia


follows with Dr. Génesis chen





#Constipation


Continue stool softeners and laxatives


Check TSH, calcium











DVT prophylaxis heparin 3 times a day

## 2022-04-21 NOTE — P.PN
Subjective





Patient is seen for follow-up for acute kidney injury.


Patient is currently being diuresed for volume overload.


Serum creatinine is stable at 1.8 mg/dL for the last 2 days.


Lasix was decreased to once a day





24 hour urine output around 1 L





Status post CT of the abdomen last night which shows the right kidney masses 

significantly large, around 7 cm.,  Suspicious for malignancy.  We will proceed 

with urology consult.





Objective





- Vital Signs


Vital signs: 


                                   Vital Signs











Temp  97.6 F   04/21/22 04:36


 


Pulse  75   04/21/22 12:00


 


Resp  16   04/21/22 12:00


 


BP  135/63   04/21/22 12:00


 


Pulse Ox  96   04/21/22 12:00








                                 Intake & Output











 04/20/22 04/21/22 04/21/22





 18:59 06:59 18:59


 


Intake Total 367  


 


Output Total 500 175 


 


Balance -133 -175 


 


Weight 109.9 kg  


 


Intake:   


 


  Oral 367  


 


Output:   


 


  Urine 500 175 


 


Other:   


 


  Voiding Method Urinal Urinal Urinal














- Exam





Patient is awake comfortable, not in any acute distress


Examination of the heart S1 and S2


Exertion lungs bilateral breath sounds are heard


Abdomen is soft nontender


Exertion lower extremities shows bilateral extremities to be wrapped.  1+ edema 

noted yesterday


CNS exam intact





- Labs


CBC & Chem 7: 


                                 04/20/22 05:48





                                 04/20/22 05:48


Labs: 


                      Microbiology - Last 24 Hours (Table)











 04/14/22 11:00 Blood Culture - Final





 Blood    No Growth after 144 hours


 


 04/14/22 10:45 Blood Culture - Final





 Blood    No Growth after 144 hours














Assessment and Plan


Assessment: 





1. AMINTA, ATN vs cardiorenal. Pt has been diuresed.  UA shows 1+ protein no signif

icant cells, no blood.. No retention noted.  Lasix was decreased.





2. CKD stage 3a secondary to nephrosclerosis. Baseline cr 1.3mg/dL


3. Volume overload, improving.


4. Right lung pneumonia, maintained on antibiotics.


5. Mild hyperkalemia associated with AMINTA, expect improvement with loop diuretics


6.  5.6 cm right renal mass.  This needs further evaluation.  Ideally patient 

will benefit from a CT with IV contrast however given his acute kidney injury 

the CAT scan is done without IV contrast.  The mass appears to be much larger at

around 7 cm..  Urology is consulted.  Family is advised that he may need 

nephrectomy which will affect his overall kidney function down the road


Plan: 





Switch Lasix to by mouth


Repeat labs 


consult urology


Treat constipation

## 2022-04-21 NOTE — P.GSCN
History of Present Illness


Consult date: 04/21/22


History of present illness: 





82-year-old gentleman in the hospital with diastolic congestive heart failure.  

He is slowly recuperating.  A CAT scan of the abdomen was done because of an 

abnormal renal ultrasound done for renal insufficiency.  The CAT scan and the 

ultrasound showed a 7 cm solid mass in the right lower pole the kidney.  As a 

coincidentally identified mass.  The creatinine is 1.3 so contrast was given.  

He has had no pain.  He has had no blood in the urine.  The congestive heart 

failure is new.  There is no obvious metastatic disease.  The patient has not 

been aware of this.





Review of Systems





- Constitutional


Reports anorexia, Reports weight gain





- Cardiovascular


Reports decreased exercise tolerance





- Respiratory


Reports dyspnea





Past Medical History


Past Medical History: Coronary Artery Disease (CAD), Cancer, Hyperlipidemia, 

Hypertension


Additional Past Medical History / Comment(s): pleurisy


History of Any Multi-Drug Resistant Organisms: None Reported


Past Surgical History: Heart Catheterization With Stent


Date of Last Stent Placement:: 2007


Past Psychological History: No Psychological Hx Reported


Smoking Status: Never smoker


Past Alcohol Use History: None Reported


Past Drug Use History: None Reported





Medications and Allergies


                                Home Medications











 Medication  Instructions  Recorded  Confirmed  Type


 


Imatinib Mesylate [Gleevec] 400 mg PO DAILY 09/01/14 04/14/22 History


 


Aspirin EC [Ecotrin Low Dose] 81 mg PO DAILY 04/14/22 04/14/22 History


 


Carvedilol [Coreg] 12.5 mg PO BID 04/14/22 04/14/22 History


 


Diclofenac Sodium Gel [Voltaren 2 gm TOPICAL QID 04/14/22 04/14/22 History





Gel]    


 


Ferrous Sulfate [Feosol] 325 mg PO DAILY 04/14/22 04/14/22 History


 


amLODIPine/ATORVASTATIN [Caduet 10 1 tab PO DAILY 04/14/22 04/14/22 History





mg-20 mg Tablet]    








                                    Allergies











Allergy/AdvReac Type Severity Reaction Status Date / Time


 


No Known Allergies Allergy   Verified 04/14/22 10:33














Surgical - Exam


                                   Vital Signs











Temp Pulse Resp BP Pulse Ox


 


 97.9 F   86   20   161/80   96 


 


 04/14/22 08:59  04/14/22 08:59  04/14/22 08:59  04/14/22 08:59  04/14/22 08:59














- General


well developed, well nourished, obese





- Eyes


PERRL





- ENT


no hearing loss





- Neck


trachea midline





- Respiratory


normal expansion, normal respiratory effort





- Cardiovascular


Rhythm: regular





- Abdomen


Abdomen: soft, tender





- Integumentary


no rash, no growths





- Neurologic


normal sensation





- Musculoskeletal


normal posture





- Psychiatric


oriented to person, oriented to place, speech is normal, memory intact





Results





- Labs





                                 04/20/22 05:48





                                 04/20/22 05:48


                      Microbiology - Last 24 Hours (Table)











 04/14/22 11:00 Blood Culture - Final





 Blood    No Growth after 144 hours


 


 04/14/22 10:45 Blood Culture - Final





 Blood    No Growth after 144 hours














- Imaging


CT scan - abdomen: report reviewed, image reviewed


CT scan - pelvis: report reviewed, image reviewed





Assessment and Plan


Assessment: 





Impression: Solid right renal mass, 7.5 cm lower pole.  Left renal cyst.  

Congestive heart failure.  Medical illnesses.





Recommendations: I reviewed the computed tomography scan with radiology and the 

patient.  I explained this mass is most likely malignant.  Patient would need an

elective right radical nephrectomy at some point time as the mass is too deep to

do a simple partial nephrectomy.  I will obtain a right renal vein ultrasound to

make sure there is no tumor in the right renal vein.  Assuming that is negative 

then a radical nephrectomy in the future be an open or robotically assisted 

laparoscopy would be discussed.  They're issue is his medical status whether be 

better treated [facility].  I'll follow this patient with 

you.


Time with Patient: Greater than 30

## 2022-04-21 NOTE — P.PN
Subjective


Progress Note Date: 04/21/22


Principal diagnosis: 





Shortness of breath.





This is an 82-year-old male patient who has a history of chronic lymphocytic 

leukemia, hypertension, hyperlipidemia, coronary artery disease with previous 

stent placement.  Nonsmoker.  He had presented to the emergency room back on 

04/14/2022 with complaints of increasing shortness of breath over the past month

worsening the past 2 weeks with significant dyspnea on exertion and swelling of 

the lower extremities.  X-ray revealed a right lower lobe infiltrate with small 

pleural effusion.  EKG reveals sinus rhythm with no significant ST or T wave 

abnormalities.  Echocardiogram revealed preserved left ventricular systolic 

function with ejection fraction 66 5%.  Mild pulmonary hypertension.  No 

significant valvular abnormalities.  Dopplers of the lower extremity were 

negative for DVT.  The cultures revealing no growth to date.  White count 17.8. 

Hemoglobin 11.0.  Sodium 135.  Potassium 4.8.  Bicarb 31.  BUN 33.  Creatinine 

1.27.  Glucose 121.  ProBNP 2670.  Influenza screen negative.  RSV screen 

negative.  COVID-19 screen negative.  The patient was treated with diuretics.  

Follow-up chest x-ray revealed improved bilateral lower lobe infiltrate and 

small effusion.  He is seen today in consultation on the selective care unit.  

Awake and alert in no acute distress.  He is sitting up in a chair at the 

bedside.  Maintaining O2 saturations in the 90s on 2 L/m per nasal cannula.  

Previously documented 96% on room air.  He's afebrile.  Hemodynamically stable.





Patient was reevaluated today on 4/17/2022, patient is feeling a bit better, 

nonetheless continues to have shortness of breath with any activity.  Follow-up 

chest x-ray since admission showed improvement in his bilateral interstitial 

edema and small effusion, pro-calcitonin level was not impressive and not 

consistent with pneumonia.  Patient has been receiving diuretics by cardiology, 

his renal functioning worsened with diuretics, hence that diuretics dose has 

been cut down by cardiology.  Still concerned about his shortness of breath was 

relatively unremarkable chest x-ray/follow-up chest x-ray, hence I'm 

recommending a VQ scan on this patient.  He did have a venous Doppler on 

admission and it was negative.  WBC count today is 17.4 hemoglobin is 11.2 el

ectrolytes are normal however renal profile is worse with BUN of 35 creatinine 

1.65.  Pro-calcitonin level is 0.1.








Progress note dated 04/18/2022.





Currently, the patient sitting at the bedside.  He's on room air.  Is not 

receiving any IV fluids.  He was started on a heparin drip, for possible pul

monary embolism.  He had a perfusion lung scan which is interpreted to be 

indeterminate for pulmonary embolism.  He did not have the ventilation portion 

of the scan.  His pro-calcitonin level is 0.10.  Dopplers of lower extremities 

were negative.  The patient apparently is considering transfer to an outside 

hospital.  This was according to the nurses spoke to the patient's son, who 

apparently is an ICU nurse.  Clinically, the patient looks stable.  White count 

19.3, hemoglobin 11.6, hematocrit 35.7, and platelet count normal.  D-dimer was 

4.14.  Sodium 134, potassium 5.1, chlorides 98, CO2 34, BUN 35, creatinine 1.74.

 Albumin was 2.8.  The perfusion lung scan was positive for a small wedge-shaped

defect in the right lower lung zone.  Again there was no ventilation portion to 

the scan.





Progress note dated 04/19/2022.





82-year-old male, seen today in room 354.  The patient's currently doing better 

today.  He feels better.  His room air saturation is 94%.  He's not receiving 

any IV fluids.  Dopplers of the lower extremities were negative.  He did have a 

perfusion lung scan which was determined to be indeterminate.  We did not think 

he needed the heparin.  The primary service also agreed with that assessment.  

Currently, his vital signs are stable.  Current laboratory data includes a white

count of 20.3, hemoglobin 11, hematocrit 34.8, and platelet count 261,000.  

Sodium 133, potassium I 0.2, chlorides 96, CO2 33, BUN 37, and creatinine 1.88. 

His initial creatinine was 1.38.  Chest x-ray shows bilateral infiltrates and 

small effusions, with my opinion appear improved.





Progress note dated 04/21/2022.





82-year-old male, again seen in room 354.  Currently on a couple liters of 

oxygen.  Apparently last night, his saturations dropped down to 90%, and for 

that reason, he was placed on oxygen.  He may have a sleep disorder, and that 

should be worked out and evaluated once he is discharged.  He may be suffering 

from sleep apnea syndrome, or insomnia.  No new laboratory noted today.  

Computed tomography scan of the abdomen and pelvis shows a large right lower 

renal mass.  This could be consistent with hypernephroma.





Objective





- Vital Signs


Vital signs: 


                                   Vital Signs











Temp  97.6 F   04/21/22 04:36


 


Pulse  74   04/21/22 08:00


 


Resp  16   04/21/22 08:00


 


BP  133/66   04/21/22 08:00


 


Pulse Ox  94 L  04/21/22 08:00








                                 Intake & Output











 04/20/22 04/21/22 04/21/22





 18:59 06:59 18:59


 


Intake Total 367  


 


Output Total 500 175 


 


Balance -133 -175 


 


Weight 109.9 kg  


 


Intake:   


 


  Oral 367  


 


Output:   


 


  Urine 500 175 


 


Other:   


 


  Voiding Method Urinal Urinal 














- Exam





No acute distress, oriented 3.  No respiratory distress, conversational 

dyspnea, or audible wheezing.  Room air saturation is 94%.





HEENT examination is grossly unremarkable.  





Neck supple.  Full range of motion.  No adenopathy thyromegaly or neck vein 

distention.





Cardiovascular examination reveals regular rhythm rate.  S1-S2 normal.  No S3 or

S4.  Very soft systolic murmur is noted.  Heart rate is 85 bpm.





Lungs essentially clear breath sounds.  Minimal scattered rhonchi.  No wheezes 

or crackles.  Breath sounds are equal bilaterally.





Abdomen soft bowel sounds are heard.  No masses or tenderness.





Extremities are intact.  No cyanosis clubbing or edema.





Skin is without rash or lesion.





Neurologic examination is brief but nonfocal.





- Labs


CBC & Chem 7: 


                                 04/20/22 05:48





                                 04/20/22 05:48


Labs: 


                      Microbiology - Last 24 Hours (Table)











 04/14/22 11:00 Blood Culture - Final





 Blood    No Growth after 144 hours


 


 04/14/22 10:45 Blood Culture - Final





 Blood    No Growth after 144 hours














Assessment and Plan


Assessment: 





Shortness of breath, secondary to acute exacerbation of diastolic CHF.





Doubt pneumonia in this patient.





Large right renal mass, possibly consistent with hypernephroma.





Possible obstructive sleep apnea syndrome, versus chronic insomnia.





CAD with previous stent placement.





History of hypertension.





History of hyperlipidemia.





Chronic lymphocytic leukemia.





Perfusion lung scan, showing a wedge-shaped defect in the right lower lobe, 

without a corresponding ventilation scan.


Plan: 





Plan dated 04/18/2022.





The patient's perfusion lung scan was read as indeterminate/intermediate proba

bility for PE.  The scan showed a small wedge-shaped perfusion defect in the 

right lower lobe.  There is no corresponding ventilation scan.  The patient 

appears not to be any distress.  He seems to be much improved just with 

diuretics.  I do not feel strongly that he needs IV heparin.  Dopplers of the 

lower extremities were negative.  Follow make recommendations where appropriate.

 Prognosis is guarded.  Apparently the patient is going to be transferred to an 

outside facility according to the patient's son.





Plan dated 04/19/2022.





The patient appears to be a bit better today.  He is currently on room air.  

Saturations are 93-94%.  Dopplers of the lower extremities were negative.  

Perfusion lung scan was indeterminate for pulmonary embolism.  Labs, x-rays, and

medications are reviewed.  His creatinine is rising.  The patient is getting 

gentle diuresis.  We will continue to follow patient and make recommendations 

where appropriate.  I did speak to the family member at the bedside.  Prognosis 

is guarded.





Plan dated 04/21/2022.





The patient will need outpatient evaluation of possible sleep apnea syndrome, 

versus chronic insomnia.  Also, the patient had a computed tomography scan of t

he abdomen and pelvis which showed a large right renal mass, consistent with 

hypernephroma.  No new labs today.  We will continue to follow.  Prognosis is 

guarded.  The patient's room air saturations are 94%.  From the pulmonary 

standpoint, the patient's doing much better.  Prognosis is certainly guarded 

given the recent findings on computed tomography scan of the abdomen.


Time with Patient: Less than 30

## 2022-04-22 VITALS — TEMPERATURE: 97.6 F | SYSTOLIC BLOOD PRESSURE: 162 MMHG | DIASTOLIC BLOOD PRESSURE: 60 MMHG

## 2022-04-22 VITALS — HEART RATE: 73 BPM

## 2022-04-22 VITALS — RESPIRATION RATE: 16 BRPM

## 2022-04-22 LAB
ANION GAP SERPL CALC-SCNC: 5 MMOL/L
BUN SERPL-SCNC: 49 MG/DL (ref 9–20)
CALCIUM SPEC-MCNC: 8.9 MG/DL (ref 8.4–10.2)
CHLORIDE SERPL-SCNC: 91 MMOL/L (ref 98–107)
CO2 SERPL-SCNC: 34 MMOL/L (ref 22–30)
ERYTHROCYTE [DISTWIDTH] IN BLOOD BY AUTOMATED COUNT: 3.43 M/UL (ref 4.3–5.9)
ERYTHROCYTE [DISTWIDTH] IN BLOOD: 13.2 % (ref 11.5–15.5)
GLUCOSE SERPL-MCNC: 90 MG/DL (ref 74–99)
HCT VFR BLD AUTO: 33.4 % (ref 39–53)
HGB BLD-MCNC: 10.9 GM/DL (ref 13–17.5)
MCH RBC QN AUTO: 31.6 PG (ref 25–35)
MCHC RBC AUTO-ENTMCNC: 32.6 G/DL (ref 31–37)
MCV RBC AUTO: 97.1 FL (ref 80–100)
PLATELET # BLD AUTO: 300 K/UL (ref 150–450)
POTASSIUM SERPL-SCNC: 5.6 MMOL/L (ref 3.5–5.1)
SODIUM SERPL-SCNC: 130 MMOL/L (ref 137–145)
WBC # BLD AUTO: 21.3 K/UL (ref 3.8–10.6)

## 2022-04-22 RX ADMIN — ONDANSETRON PRN MG: 2 INJECTION INTRAMUSCULAR; INTRAVENOUS at 10:40

## 2022-04-22 RX ADMIN — ASPIRIN 81 MG CHEWABLE TABLET SCH MG: 81 TABLET CHEWABLE at 06:58

## 2022-04-22 RX ADMIN — HEPARIN SODIUM SCH UNIT: 5000 INJECTION INTRAVENOUS; SUBCUTANEOUS at 07:00

## 2022-04-22 RX ADMIN — DOCUSATE SODIUM SCH MG: 100 CAPSULE, LIQUID FILLED ORAL at 20:16

## 2022-04-22 RX ADMIN — LACTULOSE SCH: 20 SOLUTION ORAL at 08:43

## 2022-04-22 RX ADMIN — DOCUSATE SODIUM SCH MG: 100 CAPSULE, LIQUID FILLED ORAL at 06:58

## 2022-04-22 RX ADMIN — ASPIRIN SCH MG: 325 TABLET ORAL at 07:00

## 2022-04-22 RX ADMIN — ATORVASTATIN CALCIUM SCH MG: 40 TABLET, FILM COATED ORAL at 06:57

## 2022-04-22 RX ADMIN — HEPARIN SODIUM SCH UNIT: 5000 INJECTION INTRAVENOUS; SUBCUTANEOUS at 15:23

## 2022-04-22 NOTE — P.PN
Subjective


Progress Note Date: 04/22/22





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  Upon reviewing 

the ct with radiology I questioned whether is renal vein and possible caval 

invasion of the tumor. An us of the right renal vein is suggestive of that. In 

light of that he will be better served at a medical center for the eventual 

right nephrectomy with crenal vein and possible caval invasion. WHether this has

anything to do with his chf and le edema is uncertain. He doesnt seem to have 

caval obstruction however. This has been explained to the patient and family.





Objective





- Vital Signs


Vital signs: 


                                   Vital Signs











Temp  97.8 F   04/22/22 07:44


 


Pulse  72   04/22/22 07:44


 


Resp  16   04/22/22 02:47


 


BP  157/77   04/22/22 07:44


 


Pulse Ox  96   04/22/22 08:04








                                 Intake & Output











 04/21/22 04/22/22 04/22/22





 18:59 06:59 18:59


 


Intake Total 358  


 


Output Total 300 175 


 


Balance 58 -175 


 


Intake:   


 


  Oral 358  


 


Output:   


 


  Gastric Drainage  75 


 


  Urine 300 100 


 


Other:   


 


  Voiding Method Urinal Urinal Urinal


 


  # Bowel Movements  1 














- Labs


CBC & Chem 7: 


                                 04/22/22 07:53





                                 04/22/22 07:53


Labs: 


                  Abnormal Lab Results - Last 24 Hours (Table)











  04/21/22 04/22/22 04/22/22 Range/Units





  13:04 07:53 07:53 


 


WBC   21.3 H   (3.8-10.6)  k/uL


 


RBC   3.43 L   (4.30-5.90)  m/uL


 


Hgb   10.9 L   (13.0-17.5)  gm/dL


 


Hct   33.4 L   (39.0-53.0)  %


 


Sodium  130 L   130 L  (137-145)  mmol/L


 


Potassium  5.7 H   5.6 H  (3.5-5.1)  mmol/L


 


Chloride  91 L   91 L  ()  mmol/L


 


Carbon Dioxide  34 H   34 H  (22-30)  mmol/L


 


BUN  51 H   49 H  (9-20)  mg/dL


 


Creatinine  2.06 H   1.86 H  (0.66-1.25)  mg/dL


 


Glucose  126 H    (74-99)  mg/dL

## 2022-04-22 NOTE — US
EXAMINATION TYPE: US abdomen limited

 

DATE OF EXAM: 4/22/2022

 

COMPARISON: US, CT

 

CLINICAL HISTORY: us the right renal vein, r/o tumor in the vein. Assess right renal vein per radiolo
gist's recommendation.

 

EXAM MEASUREMENTS:

 

Right Kidney:  Segmental Renal Vein(s) color flow noted to hilum, then mass effect is imaged. Right r
enal vein area noted on image #[id number] but no color flow is seen within hypoechoic tubular structure myla
t increases in size from hilum to IVC.  Right renal artery PW Doppler and color flow is noted at hilu
m on image #[id number]. Color flow and PW Doppler is noted within superior IVC

 

Two masses noted right kidney with larger mid to lower pole = 5.8 x 5.4 x 4.3cm and mass extending of
f inferior cortex = 3.8 x  3.7 x 3.7cm.

 

IMPRESSION: 

1. Two renal masses present.

2. Vascular arterial flow is present. There appears to be limited identification of flow within the i
nferior vena cava. Renal vein flow is not identified. Findings can be related to thrombus associated 
with neoplasm.

## 2022-04-22 NOTE — P.PN
Subjective


Principal diagnosis: 


Chart was reviewed patient was seen and examined.  Patient is up in the chair 

this morning no respiratory complaints.  She had multiple solid bowel movements.

 Reporting no abdominal pain no nausea no vomiting no respiratory issues.








Objective





- Vital Signs


Vital signs: 


                                   Vital Signs











Temp  97.8 F   04/22/22 07:44


 


Pulse  72   04/22/22 07:44


 


Resp  16   04/22/22 02:47


 


BP  157/77   04/22/22 07:44


 


Pulse Ox  96   04/22/22 08:04








                                 Intake & Output











 04/21/22 04/22/22 04/22/22





 18:59 06:59 18:59


 


Intake Total 358  


 


Output Total 300 175 


 


Balance 58 -175 


 


Intake:   


 


  Oral 358  


 


Output:   


 


  Gastric Drainage  75 


 


  Urine 300 100 


 


Other:   


 


  Voiding Method Urinal Urinal 


 


  # Bowel Movements  1 














- Exam





GENERAL: AAO X3 feeling better compared to yesterday


HEENT: Head is normocephalic. Pupils are equal, round. Sclerae anicteric. Mucous

membranes of the mouth are moist.  No JVD. 


CHEST EXAMINATION: Lungs are diminished in the bases, very slight crackles 

appreciated no wheezing.  No chest wall tenderness is noted on palpation or with

deep breathing. 


HEART EXAMINATION: Regular rate and rhythm. S1, S2 heard. Systolic ejection 

murmur heard at base and apex 


ABDOMEN: Soft, nontender. Positive bowel sounds.


EXTREMITIES: +2 pitting danielle b/l improving


NEUROLOGIC EXAMINATION: Patient is awake, alert and oriented x3.





- Labs


CBC & Chem 7: 


                                 04/22/22 07:53





                                 04/22/22 07:53


Labs: 


                  Abnormal Lab Results - Last 24 Hours (Table)











  04/21/22 04/22/22 04/22/22 Range/Units





  13:04 07:53 07:53 


 


WBC   21.3 H   (3.8-10.6)  k/uL


 


RBC   3.43 L   (4.30-5.90)  m/uL


 


Hgb   10.9 L   (13.0-17.5)  gm/dL


 


Hct   33.4 L   (39.0-53.0)  %


 


Sodium  130 L   130 L  (137-145)  mmol/L


 


Potassium  5.7 H   5.6 H  (3.5-5.1)  mmol/L


 


Chloride  91 L   91 L  ()  mmol/L


 


Carbon Dioxide  34 H   34 H  (22-30)  mmol/L


 


BUN  51 H   49 H  (9-20)  mg/dL


 


Creatinine  2.06 H   1.86 H  (0.66-1.25)  mg/dL


 


Glucose  126 H    (74-99)  mg/dL














Assessment and Plan


Plan: 


#Acute on chronic diastolic CHF exacerbation


-Patient has significant weight gain of approximately 37 pounds.  Baseline 

weight is 212 pounds as per patient.  


-Strict input/output, low sodium diet, fluid less than 1.4 L. daily weight


Continue per cardiology recommendations


Currently he is on room air





Less likely component of pneumonia, Pro calcitonin is low, blood cultures no 

growth to date, pulmonary recommended discontinuation of antibiotics





#Acute kidney injury 


Nephrology following


Monitor creatinine, urine output, avoid nephrotoxins, monitor electrolytes


Potassium 5.6 this morning





#Right renal mass


Likely malignant, urology evaluated recommended nephrectomy in near future


Patient will follow-up with urology on this


Patient does have oncologist in town and may follow up with him


Renal ultrasound to assess renal vein today








#Acute hypoxic respiratory distress secondary to above multifactorial congestive

heart failure/CAPD 


Pulmonary service following


Currently on room air, might have a component of sleep apnea


-Maintain saturations above 90%, encourage incentive spirometry 


-Well score low, DVT negative the lower extremity, VQ scan reviewed also 

independently reviewed with pulmonary.  At this time low threshold for PE as a 

patient not tachycardic, tachypneic.  Respiratory status actually improving.  

Per pulmonary, no need for anticoagulation 





#Coronary artery disease


-Continue home medications aspirin and atorvastatin


-Continue home medication carvedilol


-Coronary artery disease s/p PCI x 2 to the mid LAD in 2006





#Hypertension


-Blood pressure goal of less than 130/80





#Obesity


-Lifestyle modification and dietary changes as able





#History of Leukemia


follows with Dr. Capps group


Patient is on Gleevec


Leukocytosis, likely reactive or related to leukemia





#Constipation


Good response with laxatives, okay to discontinue


Continue on Colace








Disposition: Discussed with patient's son, plan for patient to go home.  However

he is still having elevated potassium and renal issues.  Likely to repeat labs 

and consider discharge tomorrow if this improves.











DVT prophylaxis heparin 3 times a day

## 2022-04-22 NOTE — P.CONS
History of Present Illness





- Reason for Consult


Consult date: 04/22/22


CML concern for new malignancy


Requesting physician: Wagner Willingham





- History of Present Illness





Feels well, tolerating Gleevec well. 





Patient of Dr. Storm who has known history of CML maintained on gleevac. WBC 

are normally between 5-9K on medication. He presents with dyspnea and during f

ull work-up was found to have abnormalities on CT scan 








Review of Systems


All systems: negative


Constitutional: Reports as per HPI





Past Medical History


Past Medical History: Coronary Artery Disease (CAD), Cancer, Hyperlipidemia, 

Hypertension


Additional Past Medical History / Comment(s): pleurisy


History of Any Multi-Drug Resistant Organisms: None Reported


Past Surgical History: Heart Catheterization With Stent


Date of Last Stent Placement:: 2007


Past Psychological History: No Psychological Hx Reported


Smoking Status: Never smoker


Past Alcohol Use History: None Reported


Past Drug Use History: None Reported





Medications and Allergies


                                Home Medications











 Medication  Instructions  Recorded  Confirmed  Type


 


Imatinib Mesylate [Gleevec] 400 mg PO DAILY 09/01/14 04/14/22 History


 


Aspirin EC [Ecotrin Low Dose] 81 mg PO DAILY 04/14/22 04/14/22 History


 


Carvedilol [Coreg] 12.5 mg PO BID 04/14/22 04/14/22 History


 


Diclofenac Sodium Gel [Voltaren 2 gm TOPICAL QID 04/14/22 04/14/22 History





Gel]    


 


Ferrous Sulfate [Feosol] 325 mg PO DAILY 04/14/22 04/14/22 History


 


amLODIPine/ATORVASTATIN [Caduet 10 1 tab PO DAILY 04/14/22 04/14/22 History





mg-20 mg Tablet]    








                                    Allergies











Allergy/AdvReac Type Severity Reaction Status Date / Time


 


No Known Allergies Allergy   Verified 04/14/22 10:33














Physical Exam


Vitals: 


                                   Vital Signs











  Temp Pulse Pulse Pulse Resp BP BP


 


 04/22/22 08:04       


 


 04/22/22 07:44  97.8 F   72     157/77


 


 04/22/22 02:47  97.6 F   77   16   160/72


 


 04/21/22 23:35  97.7 F  73    20  154/73 


 


 04/21/22 20:33     68  20  


 


 04/21/22 19:40  97.6 F    68  20  130/60 


 


 04/21/22 15:43  96.7 F L    72  16  153/70 














  Pulse Ox


 


 04/22/22 08:04  96


 


 04/22/22 07:44  96


 


 04/22/22 02:47  95


 


 04/21/22 23:35  96


 


 04/21/22 20:33 


 


 04/21/22 19:40 


 


 04/21/22 15:43  94 L








                                Intake and Output











 04/21/22 04/22/22 04/22/22





 22:59 06:59 14:59


 


Intake Total 358  


 


Output Total  175 400


 


Balance 358 -175 -400


 


Intake:   


 


  Oral 358  


 


Output:   


 


  Gastric Drainage  75 


 


  Urine  100 400


 


Other:   


 


  Voiding Method Urinal  Urinal


 


  # Bowel Movements 0 1 


 


  Weight   109.9 kg














- Constitutional


General appearance: cooperative, no acute distress





- EENT


Eyes: EOMI


ENT: hard of hearing, NA/AT





- Neck


Neck: normal ROM





- Respiratory


Respiratory: bilateral: rhonchi (bases crackles)





- Cardiovascular


Rhythm: regularly irregular


  ** leg


Peripheral Edema: bilateral: 1+





- Gastrointestinal


General gastrointestinal: soft, tenderness





- Integumentary


Integumentary: pale





- Neurologic


Neurologic: CNII-XII intact





- Psychiatric


Psychiatric: A&O x's 3





Results


CBC & Chem 7: 


                                 04/22/22 07:53





                                 04/22/22 07:53


Labs: 


                  Abnormal Lab Results - Last 24 Hours (Table)











  04/22/22 04/22/22 Range/Units





  07:53 07:53 


 


WBC  21.3 H   (3.8-10.6)  k/uL


 


RBC  3.43 L   (4.30-5.90)  m/uL


 


Hgb  10.9 L   (13.0-17.5)  gm/dL


 


Hct  33.4 L   (39.0-53.0)  %


 


Sodium   130 L  (137-145)  mmol/L


 


Potassium   5.6 H  (3.5-5.1)  mmol/L


 


Chloride   91 L  ()  mmol/L


 


Carbon Dioxide   34 H  (22-30)  mmol/L


 


BUN   49 H  (9-20)  mg/dL


 


Creatinine   1.86 H  (0.66-1.25)  mg/dL











CT scan - abdomen: report reviewed





Assessment and Plan


(1) Kidney mass


Narrative/Plan: 


Patient is planning on transfer to [facility] for tertiary care secondary to new 

findings of likely malignancy. 


Current Visit: Yes   Status: Acute   Code(s): N28.89 - OTHER SPECIFIED DISORDERS

OF KIDNEY AND URETER   SNOMED Code(s): 025117163


   





(2) Leukocytosis


Narrative/Plan: 


This appears reactive as it is primarily neutrophilia, likely secondarry to pneu

monia


Current Visit: Yes   Status: Acute   Code(s): D72.829 - ELEVATED WHITE BLOOD 

CELL COUNT, UNSPECIFIED   SNOMED Code(s): 242599869


   





(3) CML (chronic myelocytic leukemia)


Narrative/Plan: 


Continues on gleevac


Current Visit: Yes   Status: Acute   Code(s): C92.10 - CHRONIC MYELOID LEUK, 

BCR/ABL-POSITIVE, NOT ACHIEVE REMIS   SNOMED Code(s): 66725397


   


Plan: 





Dr. Houston: I have completed the full history and physical and developed the 

above impression and plan, agree with dictation, dictated as a scribe.

## 2022-04-22 NOTE — P.PN
Subjective


Progress Note Date: 04/22/22





This is an 82-year-old male patient who has a history of chronic lymphocytic 

leukemia, hypertension, hyperlipidemia, coronary artery disease with previous 

stent placement.  Nonsmoker.  He had presented to the emergency room back on 

04/14/2022 with complaints of increasing shortness of breath over the past month

worsening the past 2 weeks with significant dyspnea on exertion and swelling of 

the lower extremities.  X-ray revealed a right lower lobe infiltrate with small 

pleural effusion.  EKG reveals sinus rhythm with no significant ST or T wave 

abnormalities.  Echocardiogram revealed preserved left ventricular systolic 

function with ejection fraction 66 5%.  Mild pulmonary hypertension.  No signifi

cant valvular abnormalities.  Dopplers of the lower extremity were negative for 

DVT.  The cultures revealing no growth to date.  White count 17.8.  Hemoglobin 

11.0.  Sodium 135.  Potassium 4.8.  Bicarb 31.  BUN 33.  Creatinine 1.27.  

Glucose 121.  ProBNP 2670.  Influenza screen negative.  RSV screen negative.  

COVID-19 screen negative.  The patient was treated with diuretics.  Follow-up 

chest x-ray revealed improved bilateral lower lobe infiltrate and small 

effusion.  He is seen today in consultation on the selective care unit.  Awake 

and alert in no acute distress.  He is sitting up in a chair at the bedside.  

Maintaining O2 saturations in the 90s on 2 L/m per nasal cannula.  Previously 

documented 96% on room air.  He's afebrile.  Hemodynamically stable.





Patient was reevaluated today on 4/17/2022, patient is feeling a bit better, 

nonetheless continues to have shortness of breath with any activity.  Follow-up 

chest x-ray since admission showed improvement in his bilateral interstitial 

edema and small effusion, pro-calcitonin level was not impressive and not 

consistent with pneumonia.  Patient has been receiving diuretics by cardiology, 

his renal functioning worsened with diuretics, hence that diuretics dose has 

been cut down by cardiology.  Still concerned about his shortness of breath was 

relatively unremarkable chest x-ray/follow-up chest x-ray, hence I'm 

recommending a VQ scan on this patient.  He did have a venous Doppler on 

admission and it was negative.  WBC count today is 17.4 hemoglobin is 11.2 

electrolytes are normal however renal profile is worse with BUN of 35 creatinine

1.65.  Pro-calcitonin level is 0.1.








Progress note dated 04/18/2022.





Currently, the patient sitting at the bedside.  He's on room air.  Is not 

receiving any IV fluids.  He was started on a heparin drip, for possible 

pulmonary embolism.  He had a perfusion lung scan which is interpreted to be 

indeterminate for pulmonary embolism.  He did not have the ventilation portion 

of the scan.  His pro-calcitonin level is 0.10.  Dopplers of lower extremities 

were negative.  The patient apparently is considering transfer to an outside 

hospital.  This was according to the nurses spoke to the patient's son, who 

apparently is an ICU nurse.  Clinically, the patient looks stable.  White count 

19.3, hemoglobin 11.6, hematocrit 35.7, and platelet count normal.  D-dimer was 

4.14.  Sodium 134, potassium 5.1, chlorides 98, CO2 34, BUN 35, creatinine 1.74.

 Albumin was 2.8.  The perfusion lung scan was positive for a small wedge-shaped

defect in the right lower lung zone.  Again there was no ventilation portion to 

the scan.





Progress note dated 04/19/2022.





82-year-old male, seen today in room 354.  The patient's currently doing better 

today.  He feels better.  His room air saturation is 94%.  He's not receiving 

any IV fluids.  Dopplers of the lower extremities were negative.  He did have a 

perfusion lung scan which was determined to be indeterminate.  We did not think 

he needed the heparin.  The primary service also agreed with that assessment.  

Currently, his vital signs are stable.  Current laboratory data includes a white

count of 20.3, hemoglobin 11, hematocrit 34.8, and platelet count 261,000.  

Sodium 133, potassium I 0.2, chlorides 96, CO2 33, BUN 37, and creatinine 1.88. 

His initial creatinine was 1.38.  Chest x-ray shows bilateral infiltrates and 

small effusions, with my opinion appear improved.








The patient is seen today 04/20/2022 in follow-up on the selective care unit.  

He is currently sitting up in a chair at the bedside.  Awake and alert in no 

acute distress.  He is maintaining good O2 saturations in the mid 90s on room 

air.  He's been afebrile.  Hemodynamically stable.  Chest x-ray reveals 

bilateral infiltrate with pleural effusions which are stable compared to 

previous.  Ultrasound of the kidneys reveal a suspicious 5.6 cm lower pole right

renal mass.  No hydronephrosis or nephrolithiasis.  Blood cultures revealed no 

growth.  White count 18.3.  Hemoglobin 11.1.  Platelets 294.  Sodium 133.  

Potassium 5.4.  BUN 44.  Creatinine 1.84.  Glucose 110.  He remains on IV 

diuretics, Aldactone, bronchodilators.  Currently in a +500 ML balance





The patient is seen today 04/22/2022 in follow-up on the regular medical floor. 

He is currently sitting up in a chair at the bedside.  Awake and alert in no 

acute distress.  He is maintaining good O2 saturations in the mid 90s on room 

air.  He's been afebrile.  Hemodynamically stable. Blood cultures reveal no g

rowth.  White count 21.3.  Hemoglobin 10.9.  Sodium 1:30.  Potassium 5.6.  

Chloride 91.  CO2 34.  BUN 49.  Creatinine 1.86.  TSH 1.10.  He remains on 

DuoNeb inhalations.  Oral diuretics.  Heparin for DVT prophylaxis.





Objective





- Vital Signs


Vital signs: 


                                   Vital Signs











Temp  97.8 F   04/22/22 07:44


 


Pulse  72   04/22/22 07:44


 


Resp  16   04/22/22 02:47


 


BP  157/77   04/22/22 07:44


 


Pulse Ox  96   04/22/22 08:04








                                 Intake & Output











 04/21/22 04/22/22 04/22/22





 18:59 06:59 18:59


 


Intake Total 358  


 


Output Total 300 175 400


 


Balance 58 -175 -400


 


Weight   109.9 kg


 


Intake:   


 


  Oral 358  


 


Output:   


 


  Gastric Drainage  75 


 


  Urine 300 100 400


 


Other:   


 


  Voiding Method Urinal Urinal Urinal


 


  # Bowel Movements  1 














- Exam





GENERAL EXAM: Alert, obese 82-year-old male patient, on room air, comfortable in

no apparent distress.


HEAD: Normocephalic.


EYES: Normal reaction of pupils, equal size.


NOSE: Clear with pink turbinates.


THROAT: No erythema or exudates.


NECK: No masses, no JVD.


CHEST: No chest wall deformity.


LUNGS: Equal air entry with crackles in the bilateral bases.


CVS: S1 and S2 normal with no audible murmur, regular rhythm.


ABDOMEN: No hepatosplenomegaly, normal bowel sounds, no guarding or rigidity.


SPINE: No scoliosis or deformity


SKIN: No rashes


CENTRAL NERVOUS SYSTEM: No focal deficits, tone is normal in all 4 extremities.


EXTREMITIES: There is 1+ peripheral edema.  No clubbing, no cyanosis.  

Peripheral pulses are intact.





- Labs


CBC & Chem 7: 


                                 04/22/22 07:53





                                 04/22/22 07:53


Labs: 


                  Abnormal Lab Results - Last 24 Hours (Table)











  04/21/22 04/22/22 04/22/22 Range/Units





  13:04 07:53 07:53 


 


WBC   21.3 H   (3.8-10.6)  k/uL


 


RBC   3.43 L   (4.30-5.90)  m/uL


 


Hgb   10.9 L   (13.0-17.5)  gm/dL


 


Hct   33.4 L   (39.0-53.0)  %


 


Sodium  130 L   130 L  (137-145)  mmol/L


 


Potassium  5.7 H   5.6 H  (3.5-5.1)  mmol/L


 


Chloride  91 L   91 L  ()  mmol/L


 


Carbon Dioxide  34 H   34 H  (22-30)  mmol/L


 


BUN  51 H   49 H  (9-20)  mg/dL


 


Creatinine  2.06 H   1.86 H  (0.66-1.25)  mg/dL


 


Glucose  126 H    (74-99)  mg/dL














Assessment and Plan


Assessment: 





1 Acute exacerbation of diastolic congestive heart failure.  Chest x-ray shows 

improvement post diuretics.





2 Dyspnea on exertion secondary to above, doubt community-acquired pneumonia of 

the right lung base. Procalcitonin 0.10





3 Coronary artery disease with previous stent placement





4 Hypertension





5 Hyperlipidemia





6 Chronic lymphocytic leukemia





7 Right renal mass measuring 5.6 cm. ultrasound of the abdomen today revealed 2 

masses noted in the right kidney was larger mid to lower pole equaling 5.8 x 5.4

x 4.3 cm and mass extending off the inferior cortex clean 3.8 x 3.7 x 3.7 cm.  

Questionable decreased flow within the inferior vena cava may be related to 

thrombus associated with neoplasm.





Plan:





The patient was seen and evaluated


Labs and medications reviewed


Stable and on room air


Ultrasound of the abdomen reviewed


Urology has been consulted


Recommending surgery to be performed at a tertiary care center





I have personally seen and examined the patient, performed the documentation and

the assessment and plan as written.  Number of minutes spent on the visit: 10.

## 2022-04-22 NOTE — P.PN
Subjective





Patient is seen for follow-up for acute kidney injury.


Patient is currently being diuresed for volume overload.


Serum creatinine is stable at 1.8 mg/dL for the last 2 days.


Lasix was decreased to once a day





Status post CT of the abdomen last night which shows the right kidney masses 

significantly large, around 7 cm.,  Suspicious for malignancy.  Urology has been

consulted.





No significant complaints today.





Objective





- Vital Signs


Vital signs: 


                                   Vital Signs











Temp  97.7 F   04/22/22 14:00


 


Pulse  68   04/22/22 14:00


 


Resp  16   04/22/22 02:47


 


BP  125/66   04/22/22 14:00


 


Pulse Ox  93 L  04/22/22 14:00








                                 Intake & Output











 04/21/22 04/22/22 04/22/22





 18:59 06:59 18:59


 


Intake Total 358  


 


Output Total 300 175 400


 


Balance 58 -175 -400


 


Weight   109.9 kg


 


Intake:   


 


  Oral 358  


 


Output:   


 


  Gastric Drainage  75 


 


  Urine 300 100 400


 


Other:   


 


  Voiding Method Urinal Urinal Urinal


 


  # Bowel Movements  1 














- Exam





Patient is awake comfortable, not in any acute distress


Examination of the heart S1 and S2


Exertion lungs bilateral breath sounds are heard


Abdomen is soft nontender


Exertion lower extremities shows bilateral extremities to be wrapped.  1+ edema 

noted yesterday


CNS exam intact





- Labs


CBC & Chem 7: 


                                 04/22/22 07:53





                                 04/22/22 07:53


Labs: 


                  Abnormal Lab Results - Last 24 Hours (Table)











  04/22/22 04/22/22 Range/Units





  07:53 07:53 


 


WBC  21.3 H   (3.8-10.6)  k/uL


 


RBC  3.43 L   (4.30-5.90)  m/uL


 


Hgb  10.9 L   (13.0-17.5)  gm/dL


 


Hct  33.4 L   (39.0-53.0)  %


 


Sodium   130 L  (137-145)  mmol/L


 


Potassium   5.6 H  (3.5-5.1)  mmol/L


 


Chloride   91 L  ()  mmol/L


 


Carbon Dioxide   34 H  (22-30)  mmol/L


 


BUN   49 H  (9-20)  mg/dL


 


Creatinine   1.86 H  (0.66-1.25)  mg/dL














Assessment and Plan


Assessment: 





1. AMINTA, ATN vs cardiorenal. Pt has been diuresed.  UA shows 1+ protein no 

significant cells, no blood.. No retention noted.  Lasix was decreased.





2. CKD stage 3a secondary to nephrosclerosis. Baseline cr 1.3mg/dL


3. Volume overload, improving.


4. Right lung pneumonia, maintained on antibiotics.


5. Mild hyperkalemia associated with AMINTA, expect improvement with loop diuretics


6.  5.6 cm right renal mass.  This needs further evaluation.  Ideally patient 

will benefit from a CT with IV contrast however given his acute kidney injury 

the CAT scan is done without IV contrast.  The mass appears to be much larger at

around 7 cm..  Urology is consulted.  Family is advised that he may need 

nephrectomy which will affect his overall kidney function down the road


Plan: 





Continue with oral Lasix


Repeat lokelma today